# Patient Record
Sex: MALE | Race: WHITE | NOT HISPANIC OR LATINO | Employment: FULL TIME | URBAN - METROPOLITAN AREA
[De-identification: names, ages, dates, MRNs, and addresses within clinical notes are randomized per-mention and may not be internally consistent; named-entity substitution may affect disease eponyms.]

---

## 2017-05-28 ENCOUNTER — APPOINTMENT (OUTPATIENT)
Dept: RADIOLOGY | Facility: MEDICAL CENTER | Age: 37
End: 2017-05-28
Attending: EMERGENCY MEDICINE
Payer: COMMERCIAL

## 2017-05-28 ENCOUNTER — HOSPITAL ENCOUNTER (EMERGENCY)
Facility: MEDICAL CENTER | Age: 37
End: 2017-05-28
Attending: EMERGENCY MEDICINE
Payer: COMMERCIAL

## 2017-05-28 VITALS
DIASTOLIC BLOOD PRESSURE: 107 MMHG | SYSTOLIC BLOOD PRESSURE: 156 MMHG | TEMPERATURE: 98.8 F | WEIGHT: 282 LBS | HEART RATE: 79 BPM | BODY MASS INDEX: 38.19 KG/M2 | OXYGEN SATURATION: 94 % | HEIGHT: 72 IN | RESPIRATION RATE: 18 BRPM

## 2017-05-28 DIAGNOSIS — S82.831A CLOSED FRACTURE OF DISTAL END OF RIGHT FIBULA, UNSPECIFIED FRACTURE MORPHOLOGY, INITIAL ENCOUNTER: ICD-10-CM

## 2017-05-28 DIAGNOSIS — M24.171 DERANGEMENT OF ANKLE, RIGHT: ICD-10-CM

## 2017-05-28 PROCEDURE — A9270 NON-COVERED ITEM OR SERVICE: HCPCS | Performed by: EMERGENCY MEDICINE

## 2017-05-28 PROCEDURE — 29515 APPLICATION SHORT LEG SPLINT: CPT

## 2017-05-28 PROCEDURE — 99284 EMERGENCY DEPT VISIT MOD MDM: CPT

## 2017-05-28 PROCEDURE — 700111 HCHG RX REV CODE 636 W/ 250 OVERRIDE (IP): Performed by: EMERGENCY MEDICINE

## 2017-05-28 PROCEDURE — 700102 HCHG RX REV CODE 250 W/ 637 OVERRIDE(OP): Performed by: EMERGENCY MEDICINE

## 2017-05-28 PROCEDURE — 302875 HCHG BANDAGE ACE 4 OR 6""

## 2017-05-28 PROCEDURE — 73610 X-RAY EXAM OF ANKLE: CPT | Mod: RT

## 2017-05-28 RX ORDER — ONDANSETRON 4 MG/1
4 TABLET, FILM COATED ORAL EVERY 6 HOURS PRN
Qty: 15 TAB | Refills: 0 | Status: ON HOLD | OUTPATIENT
Start: 2017-05-28 | End: 2017-06-02

## 2017-05-28 RX ORDER — ONDANSETRON 2 MG/ML
4 INJECTION INTRAMUSCULAR; INTRAVENOUS ONCE
Status: COMPLETED | OUTPATIENT
Start: 2017-05-28 | End: 2017-05-28

## 2017-05-28 RX ORDER — OXYCODONE HYDROCHLORIDE AND ACETAMINOPHEN 5; 325 MG/1; MG/1
1 TABLET ORAL ONCE
Status: COMPLETED | OUTPATIENT
Start: 2017-05-28 | End: 2017-05-28

## 2017-05-28 RX ORDER — OXYCODONE HYDROCHLORIDE AND ACETAMINOPHEN 5; 325 MG/1; MG/1
1 TABLET ORAL EVERY 6 HOURS PRN
Qty: 20 TAB | Refills: 0 | Status: SHIPPED | OUTPATIENT
Start: 2017-05-28 | End: 2017-08-21

## 2017-05-28 RX ADMIN — ONDANSETRON 4 MG: 2 INJECTION INTRAMUSCULAR; INTRAVENOUS at 18:52

## 2017-05-28 RX ADMIN — OXYCODONE HYDROCHLORIDE AND ACETAMINOPHEN 1 TABLET: 5; 325 TABLET ORAL at 21:32

## 2017-05-28 RX ADMIN — HYDROMORPHONE HYDROCHLORIDE 1 MG: 1 INJECTION, SOLUTION INTRAMUSCULAR; INTRAVENOUS; SUBCUTANEOUS at 18:52

## 2017-05-28 ASSESSMENT — PAIN SCALES - GENERAL
PAINLEVEL_OUTOF10: 10
PAINLEVEL_OUTOF10: 10

## 2017-05-28 ASSESSMENT — LIFESTYLE VARIABLES: DO YOU DRINK ALCOHOL: NO

## 2017-05-28 NOTE — ED AVS SNAPSHOT
5/28/2017    Ken Avilez  [address]  [facility] [address]    Dear Ken:    [facility] wants to ensure your discharge home is safe and you or your loved ones have had all of your questions answered regarding your care after you leave the hospital.    Below is a list of resources and contact information should you have any questions regarding your hospital stay, follow-up instructions, or active medical symptoms.    Questions or Concerns Regarding… Contact   Medical Questions Related to Your Discharge  (7 days a week, 8am-5pm) Contact a Nurse Care Coordinator   [phone]   Medical Questions Not Related to Your Discharge  (24 hours a day / 7 days a week)  Contact the Nurse Health Line   [phone]    Medications or Discharge Instructions Refer to your discharge packet   or contact your [facility] Primary Care Provider   [phone]   Follow-up Appointment(s) Schedule your appointment via FestEvo   or contact Scheduling [phone]   Billing Review your statement via FestEvo  or contact Billing [phone]   Medical Records Review your records via FestEvo   or contact Medical Records [phone]     You may receive a telephone call within two days of discharge. This call is to make certain you understand your discharge instructions and have the opportunity to have any questions answered. You can also easily access your medical information, test results and upcoming appointments via the FestEvo free online health management tool. You can learn more and sign up at ShapeUp/FestEvo. For assistance setting up your FestEvo account, please call [phone].    Once again, we want to ensure your discharge home is safe and that you have a clear understanding of any next steps in your care. If you have any questions or concerns, please do not hesitate to contact us, we are here for you. Thank you for choosing [facility] for your healthcare needs.    Sincerely,    Your [facility] Healthcare Team

## 2017-05-28 NOTE — ED AVS SNAPSHOT
Home Care Instructions                                                                                                                Ken Avilez   MRN: [id number]    Department:  [facility], Emergency Dept   Date of Visit:  5/28/2017            [facility], Emergency Dept    [address]    [facility] [address]    Phone:  [phone]      You were seen by     Darrell Echeverria M.D.      Your Diagnosis Was     Closed fracture of distal end of right fibula, unspecified fracture morphology, initial encounter     S82.726M       These are the medications you received during your hospitalization from 05/28/2017 1818 to 05/28/2017 2127     Date/Time Order Dose Route Action    05/28/2017 1852 HYDROmorphone (DILAUDID) injection 1 mg 1 mg Intramuscular Given    05/28/2017 1852 ondansetron (ZOFRAN) syringe/vial injection 4 mg 4 mg Intramuscular Given      Follow-up Information     1. Follow up with Mike Luna M.D.. Schedule an appointment as soon as possible for a visit in 3 days.    Specialty:  Orthopaedics    Why:  call Tues for Wed appt    Contact information    [address]  [address]  [facility] [id number] [phone]        Medication Information     Review all of your home medications and newly ordered medications with your primary doctor and/or pharmacist as soon as possible. Follow medication instructions as directed by your doctor and/or pharmacist.     Please keep your complete medication list with you and share with your physician. Update the information when medications are discontinued, doses are changed, or new medications (including over-the-counter products) are added; and carry medication information at all times in the event of emergency situations.               Medication List      START taking these medications        Instructions    Morning Afternoon Evening Bedtime    ondansetron 4 MG Tabs tablet   Commonly known as:  ZOFRAN        Take 1 Tab by mouth  every 6 hours as needed for Nausea/Vomiting.   Dose:  4 mg                        oxycodone-acetaminophen 5-325 MG Tabs   Commonly known as:  PERCOCET        Take 1 Tab by mouth every 6 hours as needed (pain).   Dose:  1 Tab                          ASK your doctor about these medications        Instructions    Morning Afternoon Evening Bedtime    azithromycin 250 MG Tabs   Commonly known as:  ZITHROMAX        Take two tabs by mouth on day one, then one tab by mouth daily on days 2-5.                             Where to Get Your Medications      You can get these medications from any pharmacy     Bring a paper prescription for each of these medications    - ondansetron 4 MG Tabs tablet  - oxycodone-acetaminophen 5-325 MG Tabs            Procedures and tests performed during your visit     DX-ANKLE 3+ VIEWS RIGHT        Discharge Instructions       Cast or Splint Care  Casts and splints support injured limbs and keep bones from moving while they heal. It is important to care for your cast or splint at home.    HOME CARE INSTRUCTIONS  · Keep the cast or splint uncovered during the drying period. It can take 24 to 48 hours to dry if it is made of plaster. A fiberglass cast will dry in less than 1 hour.  · Do not rest the cast on anything harder than a pillow for the first 24 hours.  · Do not put weight on your injured limb or apply pressure to the cast until your health care provider gives you permission.  · Keep the cast or splint dry. Wet casts or splints can lose their shape and may not support the limb as well. A wet cast that has lost its shape can also create harmful pressure on your skin when it dries. Also, wet skin can become infected.  ¨ Cover the cast or splint with a plastic bag when bathing or when out in the rain or snow. If the cast is on the trunk of the body, take sponge baths until the cast is removed.  ¨ If your cast does become wet, dry it with a towel or a blow dryer on the cool setting  only.  · Keep your cast or splint clean. Soiled casts may be wiped with a moistened cloth.  · Do not place any hard or soft foreign objects under your cast or splint, such as cotton, toilet paper, lotion, or powder.  · Do not try to scratch the skin under the cast with any object. The object could get stuck inside the cast. Also, scratching could lead to an infection. If itching is a problem, use a blow dryer on a cool setting to relieve discomfort.  · Do not trim or cut your cast or remove padding from inside of it.  · Exercise all joints next to the injury that are not immobilized by the cast or splint. For example, if you have a long leg cast, exercise the hip joint and toes. If you have an arm cast or splint, exercise the shoulder, elbow, thumb, and fingers.  · Elevate your injured arm or leg on 1 or 2 pillows for the first 1 to 3 days to decrease swelling and pain. It is best if you can comfortably elevate your cast so it is higher than your heart.  SEEK MEDICAL CARE IF:   · Your cast or splint cracks.  · Your cast or splint is too tight or too loose.  · You have unbearable itching inside the cast.  · Your cast becomes wet or develops a soft spot or area.  · You have a bad smell coming from inside your cast.  · You get an object stuck under your cast.  · Your skin around the cast becomes red or raw.  · You have new pain or worsening pain after the cast has been applied.  SEEK IMMEDIATE MEDICAL CARE IF:   · You have fluid leaking through the cast.  · You are unable to move your fingers or toes.  · You have discolored (blue or white), cool, painful, or very swollen fingers or toes beyond the cast.  · You have tingling or numbness around the injured area.  · You have severe pain or pressure under the cast.  · You have any difficulty with your breathing or have shortness of breath.  · You have chest pain.     This information is not intended to replace advice given to you by your health care provider. Make sure you  discuss any questions you have with your health care provider.     Document Released: 12/15/2001 Document Revised: 10/08/2014 Document Reviewed: 06/26/2014  Elsevier Interactive Patient Education ©2016 Elsevier Inc.            Patient Information     Patient Information    Following emergency treatment: all patient requiring follow-up care must return either to a private physician or a clinic if your condition worsens before you are able to obtain further medical attention, please return to the emergency room.     Billing Information    At [facility], we work to make the billing process streamlined for our patients.  Our Representatives are here to answer any questions you may have regarding your hospital bill.  If you have insurance coverage and have supplied your insurance information to us, we will submit a claim to your insurer on your behalf.  Should you have any questions regarding your bill, we can be reached online or by phone as follows:  Online: You are able pay your bills online or live chat with our representatives about any billing questions you may have. We are here to help Monday - Friday from 8:00am to 7:30pm and 9:00am - 12:00pm on Saturdays.  Please visit [email or web address]  for more information.   Phone:  [phone] or [phone]    Please note that your emergency physician, surgeon, pathologist, radiologist, anesthesiologist, and other specialists are not employed by [facility] and will therefore bill separately for their services.  Please contact them directly for any questions concerning their bills at the numbers below:     Emergency Physician Services:  [phone]  [facility]:  [phone]  [facility]:  [phone]  [facility]:  [phone]    1. Your final bill may vary from the amount quoted upon discharge if all procedures are not complete at that time, or if your doctor has additional  procedures of which we are not aware. You will receive an additional bill if you return to the Emergency Department at [facility] for suture removal regardless of the facility of which the sutures were placed.     2. Please arrange for settlement of this account at the emergency registration.    3. All self-pay accounts are due in full at the time of treatment.  If you are unable to meet this obligation then payment is expected within 4-5 days.     4. If you have had radiology studies (CT, X-ray, Ultrasound, MRI), you have received a preliminary result during your emergency department visit. Please contact the radiology department [phone] to receive a copy of your final result. Please discuss the Final result with your primary physician or with the follow up physician provided.     Crisis Hotline:  [place] Crisis Hotline:  4-868-UOGLFYE or 1-560.289.2637  [place] Crisis Hotline:    1-603.775.6751 or 571-714-2791         ED Discharge Follow Up Questions    1. In order to provide you with very good care, we would like to follow up with a phone call in the next few days.  May we have your permission to contact you?     YES /  NO    2. What is the best phone number to call you? (       )_____-__________    3. What is the best time to call you?      Morning  /  Afternoon  /  Evening                   Patient Signature:  ____________________________________________________________    Date:  ____________________________________________________________

## 2017-05-28 NOTE — ED AVS SNAPSHOT
[employer] Access Code: [email or web address]  Expires: 6/21/2017  4:09 AM    Your email address is not on file at [facility].  Email Addresses are required for you to sign up for [employer], please contact [phone] to verify your personal information and to provide your email address prior to attempting to register for [employer].    Ken Avilez  [address]  [place] [address]    [employer]  A secure, online tool to manage your health information     [facility]’s [employer]® is a secure, online tool that connects you to your personalized health information from the privacy of your home -- day or night - making it very easy for you to manage your healthcare. Once the activation process is completed, you can even access your medical information using the [employer] gloria, which is available for free in the Apple Gloria store or Google Play store.     To learn more about [employer], visit [email or web address]    There are two levels of access available (as shown below):   My Chart Features  [facility] Primary Care Doctor [facility]  Specialists [facility]  Urgent  Care Non-[facility] Primary Care Doctor   Email your healthcare team securely and privately 24/7 X X X    Manage appointments: schedule your next appointment; view details of past/upcoming appointments X      Request prescription refills. X      View recent personal medical records, including lab and immunizations X X X X   View health record, including health history, allergies, medications X X X X   Read reports about your outpatient visits, procedures, consult and ER notes X X X X   See your discharge summary, which is a recap of your hospital and/or ER visit that includes your diagnosis, lab results, and care plan X X  X     How to register for [employer]:  Once your e-mail address has been verified, follow the following steps to sign up for [employer].     1. Go to  [email or web address]  2. Click on the Sign Up Now box, which takes you to the New Member Sign Up page.  You will need to provide the following information:  a. Enter your [email or web address] Access Code exactly as it appears at the top of this page. (You will not need to use this code after you’ve completed the sign-up process. If you do not sign up before the expiration date, you must request a new code.)   b. Enter your date of birth.   c. Enter your home email address.   d. Click Submit, and follow the next screen’s instructions.  3. Create a [employer]t ID. This will be your [email or web address] login ID and cannot be changed, so think of one that is secure and easy to remember.  4. Create a [email or web address] password. You can change your password at any time.  5. Enter your Password Reset Question and Answer. This can be used at a later time if you forget your password.   6. Enter your e-mail address. This allows you to receive e-mail notifications when new information is available in [email or web address].  7. Click Sign Up. You can now view your health information.    For assistance activating your [email or web address] account, call [phone]

## 2017-05-29 NOTE — ED NOTES
ED christopher Rosales at bedside for splint application and crutches education. Pt to be discharged following aforementioned interventions and medication for pain.

## 2017-05-29 NOTE — ED NOTES
Ken Avilez  37 y.o.  Chief Complaint   Patient presents with   • Ankle Pain     Pt was skateboarding when he stepped off his board and his ankle rolled 90 degree. Pt sts he heard 3 pops. No obvious deformity noted. Cms intact. Pt explained triage process. Pt aware to inform staff of any changes.

## 2017-05-29 NOTE — ED PROVIDER NOTES
ED Provider Note    HPI: Patient is a 37-year-old male who presented to the emergency department May 28, 2017 at 6:16 PM with a chief complaint of right ankle pain.    Patient rolled his ankle while skateboarding. Patient felt a couple pops in his ankle. He denies any knee pain. No numbness or tingling of the right lower extremity. No head or neck trauma occurred no other extremity injury. No other somatic complaints.    Review of Systems: Positive right ankle pain negative right knee pain numbness tingling right lower extremity    Past medical/surgical history: Kidney stone heartburn and hypertension and sleep apnea    Medications: None    Allergies: Vicodin    Social History: Patient smokes one pack of cigarettes per day no alcohol use      Physical exam: Constitutional: Pleasant male awake and alert appeared somewhat uncomfortable  Vital signs:   Temperature 97.8 pulse 113 respirations 18 blood pressure 162/125 pulse oximetry 97%   Musculoskeletal: Right ankle is swollen and there is pain with palpation of both the medial and lateral malleolus. There is no pain with vigorous palpation of the right knee. No pain with palpation of right foot. No other pain with palpation or movement of muscle bone or joint. No other musculoskeletal deformities identified.  Neurologic: alert and awake answers questions appropriately. Decreased range of motion of right lower extremity due to pain in the ankle area. The patient is able to move his toes on the affected extremity with no difficulty. No other focal neurologic abnormalities identified  Skin: no rash or lesion seen, no palpable dermatologic lesions identified. Specifically no break in the skin in the right ankle area  Psychiatric: Patient appeared to be somewhat anxious but not appropriately so. He was not delusional or hallucinating.    Medical decision making:  Patient is moderately hypertensive on arrival. I suspected this was due to his discomfort. The patient does  have a previous history of hypertension. He will follow up with his primary for recheck    Patient given hydromorphone and Zofran IM with improvement    X-ray right ankle obtained; oblique nondisplaced fracture of the distal right fibula is noted with widening of the medial tibiotalar joint space consistent with a ligamentous injury. Anterior swelling is noted with a joint effusion    Dr. Luna consulted for orthopedics; he reviewed the studies. He requested the patient be placed in an OCL sent home on crutches on pain medication. The patient will call on Tuesday for her Wednesday appointment. The patient will likely require surgery.    Patient is comfortable with this plan. The patient has no evidence of compartment syndrome or neurovascular issue at this time and outpatient follow-up seems reasonable    Impression medial ankle ligamentous disruption, closed  #2) distal fibular fracture, right, closed

## 2017-05-29 NOTE — DISCHARGE INSTRUCTIONS
Cast or Splint Care  Casts and splints support injured limbs and keep bones from moving while they heal. It is important to care for your cast or splint at home.    HOME CARE INSTRUCTIONS  · Keep the cast or splint uncovered during the drying period. It can take 24 to 48 hours to dry if it is made of plaster. A fiberglass cast will dry in less than 1 hour.  · Do not rest the cast on anything harder than a pillow for the first 24 hours.  · Do not put weight on your injured limb or apply pressure to the cast until your health care provider gives you permission.  · Keep the cast or splint dry. Wet casts or splints can lose their shape and may not support the limb as well. A wet cast that has lost its shape can also create harmful pressure on your skin when it dries. Also, wet skin can become infected.  ¨ Cover the cast or splint with a plastic bag when bathing or when out in the rain or snow. If the cast is on the trunk of the body, take sponge baths until the cast is removed.  ¨ If your cast does become wet, dry it with a towel or a blow dryer on the cool setting only.  · Keep your cast or splint clean. Soiled casts may be wiped with a moistened cloth.  · Do not place any hard or soft foreign objects under your cast or splint, such as cotton, toilet paper, lotion, or powder.  · Do not try to scratch the skin under the cast with any object. The object could get stuck inside the cast. Also, scratching could lead to an infection. If itching is a problem, use a blow dryer on a cool setting to relieve discomfort.  · Do not trim or cut your cast or remove padding from inside of it.  · Exercise all joints next to the injury that are not immobilized by the cast or splint. For example, if you have a long leg cast, exercise the hip joint and toes. If you have an arm cast or splint, exercise the shoulder, elbow, thumb, and fingers.  · Elevate your injured arm or leg on 1 or 2 pillows for the first 1 to 3 days to decrease  swelling and pain. It is best if you can comfortably elevate your cast so it is higher than your heart.  SEEK MEDICAL CARE IF:   · Your cast or splint cracks.  · Your cast or splint is too tight or too loose.  · You have unbearable itching inside the cast.  · Your cast becomes wet or develops a soft spot or area.  · You have a bad smell coming from inside your cast.  · You get an object stuck under your cast.  · Your skin around the cast becomes red or raw.  · You have new pain or worsening pain after the cast has been applied.  SEEK IMMEDIATE MEDICAL CARE IF:   · You have fluid leaking through the cast.  · You are unable to move your fingers or toes.  · You have discolored (blue or white), cool, painful, or very swollen fingers or toes beyond the cast.  · You have tingling or numbness around the injured area.  · You have severe pain or pressure under the cast.  · You have any difficulty with your breathing or have shortness of breath.  · You have chest pain.     This information is not intended to replace advice given to you by your health care provider. Make sure you discuss any questions you have with your health care provider.     Document Released: 12/15/2001 Document Revised: 10/08/2014 Document Reviewed: 06/26/2014  [employer] Interactive Patient Education ©2016 Elsevier Inc.

## 2017-05-29 NOTE — ED NOTES
Pt arrived in room  Pt resting comfortably on [place].   Call light within reach and visible from nurses station.

## 2017-05-29 NOTE — CONSULTS
DATE OF SERVICE:  05/28/2017    DATE OF SERVICE:  05/28/2017.    REQUESTING PHYSICIAN:  Darrell Echeverria MD    CHIEF COMPLAINT:  Right ankle pain.    HISTORY:  The patient is 37 years old, who was skateboarding and landed   awkwardly, twisted his right ankle 90 degrees, had pain and could not bear   weight, was seen in the emergency room, found to have an ankle fracture.    Orthopedic consultation was requested.    ALLERGIES:  VICODIN.    MEDICATIONS:  None.    PAST MEDICAL HISTORY:  Kidney stones, hypertension and sleep apnea.    PAST SURGICAL HISTORY:  Kidney stone surgery.    SOCIAL HISTORY:  The patient smokes a pack a day, uses alcohol rarely.  Denies   any drug use.  He works in a desk job.  He lives in [place].    FAMILY HISTORY:  Negative.    REVIEW OF SYSTEMS:  No loss of conscious, nausea, vomiting, diarrhea,   constipation, polyuria, dysuria, fevers, chills, weight loss, weight gain,   abdominal pain, chest pain or shortness of breath.    PHYSICAL EXAMINATION:  GENERAL:  The patient is in no acute distress.  VITAL SIGNS:  His blood pressure is 160/125, heart rate 113, respirations 18,   temperature 97.8.  HEENT:  Normocephalic, atraumatic.  NECK:  Supple, nontender.  CHEST:  Nontender.  No labored breathing.  EXTREMITIES:  Left lower and bilateral upper extremities without tenderness or   deformity.  Right lower extremity shows moderate swelling at the ankle.  He   is tender around the medial and lateral side of the ankle.  He has palpable   dorsalis pedis and posterior tibialis pulse.  Skin is intact.  He is able to   dorsiflex and plantarflex his toes.    LABORATORY DATA:  No labs.    IMAGING:  Radiographs of the right ankle show mildly displaced spiral fracture   of the distal fibula at the level of the joint with some widening of the   medial joint space.    ASSESSMENT:  Right ankle fracture.    PLAN:  Place a short leg splint.  He should be nonweightbearing and elevate.    He will require surgery to  realign and stabilize the ankle.  I have explained   this to him today and he understands.  We will call him on Tuesday and set up   surgery for later in the week.  I discussed the risks of the surgery with him.    I also recommended smoking cessation.  Phone number that he gave me was   [phone].       ____________________________________     MD JARED MARTINEZ / EMIL    DD:  05/28/2017 21:29:59  DT:  05/28/2017 21:46:36    D#:  [id number]  Job#:  [id number]

## 2017-06-02 ENCOUNTER — APPOINTMENT (OUTPATIENT)
Dept: RADIOLOGY | Facility: MEDICAL CENTER | Age: 37
End: 2017-06-02
Attending: ORTHOPAEDIC SURGERY
Payer: COMMERCIAL

## 2017-06-02 ENCOUNTER — HOSPITAL ENCOUNTER (OUTPATIENT)
Facility: MEDICAL CENTER | Age: 37
End: 2017-06-02
Attending: ORTHOPAEDIC SURGERY | Admitting: ORTHOPAEDIC SURGERY
Payer: COMMERCIAL

## 2017-06-02 VITALS
OXYGEN SATURATION: 100 % | RESPIRATION RATE: 16 BRPM | TEMPERATURE: 97.2 F | BODY MASS INDEX: 38.46 KG/M2 | HEIGHT: 72 IN | HEART RATE: 73 BPM | WEIGHT: 283.95 LBS

## 2017-06-02 DIAGNOSIS — S82.831A OTHER FRACTURE OF UPPER AND LOWER END OF RIGHT FIBULA, INITIAL ENCOUNTER FOR CLOSED FRACTURE: ICD-10-CM

## 2017-06-02 PROCEDURE — 73610 X-RAY EXAM OF ANKLE: CPT | Mod: RT

## 2017-06-02 PROCEDURE — 700101 HCHG RX REV CODE 250

## 2017-06-02 PROCEDURE — 160048 HCHG OR STATISTICAL LEVEL 1-5: Performed by: ORTHOPAEDIC SURGERY

## 2017-06-02 PROCEDURE — 160025 RECOVERY II MINUTES (STATS): Performed by: ORTHOPAEDIC SURGERY

## 2017-06-02 PROCEDURE — 502000 HCHG MISC OR IMPLANTS RC 0278: Performed by: ORTHOPAEDIC SURGERY

## 2017-06-02 PROCEDURE — 160035 HCHG PACU - 1ST 60 MINS PHASE I: Performed by: ORTHOPAEDIC SURGERY

## 2017-06-02 PROCEDURE — A9270 NON-COVERED ITEM OR SERVICE: HCPCS

## 2017-06-02 PROCEDURE — C1713 ANCHOR/SCREW BN/BN,TIS/BN: HCPCS | Performed by: ORTHOPAEDIC SURGERY

## 2017-06-02 PROCEDURE — 501838 HCHG SUTURE GENERAL: Performed by: ORTHOPAEDIC SURGERY

## 2017-06-02 PROCEDURE — 160029 HCHG SURGERY MINUTES - 1ST 30 MINS LEVEL 4: Performed by: ORTHOPAEDIC SURGERY

## 2017-06-02 PROCEDURE — 501480 HCHG STOCKINETTE: Performed by: ORTHOPAEDIC SURGERY

## 2017-06-02 PROCEDURE — 160046 HCHG PACU - 1ST 60 MINS PHASE II: Performed by: ORTHOPAEDIC SURGERY

## 2017-06-02 PROCEDURE — 700102 HCHG RX REV CODE 250 W/ 637 OVERRIDE(OP)

## 2017-06-02 PROCEDURE — 160022 HCHG BLOCK: Performed by: ORTHOPAEDIC SURGERY

## 2017-06-02 PROCEDURE — 502240 HCHG MISC OR SUPPLY RC 0272: Performed by: ORTHOPAEDIC SURGERY

## 2017-06-02 PROCEDURE — 700111 HCHG RX REV CODE 636 W/ 250 OVERRIDE (IP)

## 2017-06-02 PROCEDURE — 160002 HCHG RECOVERY MINUTES (STAT): Performed by: ORTHOPAEDIC SURGERY

## 2017-06-02 PROCEDURE — 500881 HCHG PACK, EXTREMITY: Performed by: ORTHOPAEDIC SURGERY

## 2017-06-02 PROCEDURE — 160041 HCHG SURGERY MINUTES - EA ADDL 1 MIN LEVEL 4: Performed by: ORTHOPAEDIC SURGERY

## 2017-06-02 PROCEDURE — 160009 HCHG ANES TIME/MIN: Performed by: ORTHOPAEDIC SURGERY

## 2017-06-02 PROCEDURE — 160047 HCHG PACU  - EA ADDL 30 MINS PHASE II: Performed by: ORTHOPAEDIC SURGERY

## 2017-06-02 PROCEDURE — 501445 HCHG STAPLER, SKIN DISP: Performed by: ORTHOPAEDIC SURGERY

## 2017-06-02 DEVICE — PLATE DISTAL FIBULA 4H (2TX2+2TX1=6): Type: IMPLANTABLE DEVICE | Site: ANKLE | Status: FUNCTIONAL

## 2017-06-02 DEVICE — SCREW 3.5 MM NON-LOCKING TI X 16MM LONG (6TX8+2TX5=58): Type: IMPLANTABLE DEVICE | Site: ANKLE | Status: FUNCTIONAL

## 2017-06-02 DEVICE — SCREW 2.5 MM LOCKING TI X 12MM LONG (6TX8=48): Type: IMPLANTABLE DEVICE | Site: ANKLE | Status: FUNCTIONAL

## 2017-06-02 DEVICE — SCREW 3.5 MM LOCKING TI X 16MM LONG (6TX8+2TX5=58): Type: IMPLANTABLE DEVICE | Site: ANKLE | Status: FUNCTIONAL

## 2017-06-02 DEVICE — SCREW 2.5 MM LOCKING TI X 14MM LONG (6TX8=48): Type: IMPLANTABLE DEVICE | Site: ANKLE | Status: FUNCTIONAL

## 2017-06-02 DEVICE — SCREW 2.5 MM NON-LOCKING TI X 22MM LONG (6TX8=48): Type: IMPLANTABLE DEVICE | Site: ANKLE | Status: FUNCTIONAL

## 2017-06-02 RX ORDER — OXYCODONE HCL 5 MG/5 ML
SOLUTION, ORAL ORAL
Status: COMPLETED
Start: 2017-06-02 | End: 2017-06-02

## 2017-06-02 RX ORDER — SODIUM CHLORIDE, SODIUM LACTATE, POTASSIUM CHLORIDE, CALCIUM CHLORIDE 600; 310; 30; 20 MG/100ML; MG/100ML; MG/100ML; MG/100ML
INJECTION, SOLUTION INTRAVENOUS CONTINUOUS
Status: DISCONTINUED | OUTPATIENT
Start: 2017-06-02 | End: 2017-06-02 | Stop reason: HOSPADM

## 2017-06-02 RX ORDER — LIDOCAINE AND PRILOCAINE 25; 25 MG/G; MG/G
1 CREAM TOPICAL
Status: COMPLETED | OUTPATIENT
Start: 2017-06-02 | End: 2017-06-02

## 2017-06-02 RX ORDER — LIDOCAINE HYDROCHLORIDE 10 MG/ML
0.5 INJECTION, SOLUTION INFILTRATION; PERINEURAL
Status: COMPLETED | OUTPATIENT
Start: 2017-06-02 | End: 2017-06-02

## 2017-06-02 RX ORDER — LIDOCAINE HYDROCHLORIDE 10 MG/ML
INJECTION, SOLUTION INFILTRATION; PERINEURAL
Status: COMPLETED
Start: 2017-06-02 | End: 2017-06-02

## 2017-06-02 RX ORDER — IBUPROFEN 200 MG
1000 TABLET ORAL EVERY 6 HOURS PRN
COMMUNITY
End: 2023-02-11

## 2017-06-02 RX ADMIN — FENTANYL CITRATE 50 MCG: 50 INJECTION, SOLUTION INTRAMUSCULAR; INTRAVENOUS at 15:23

## 2017-06-02 RX ADMIN — LIDOCAINE HYDROCHLORIDE 0.5 ML: 10 INJECTION, SOLUTION INFILTRATION; PERINEURAL at 12:30

## 2017-06-02 RX ADMIN — SODIUM CHLORIDE, SODIUM LACTATE, POTASSIUM CHLORIDE, CALCIUM CHLORIDE: 600; 310; 30; 20 INJECTION, SOLUTION INTRAVENOUS at 12:30

## 2017-06-02 RX ADMIN — OXYCODONE HYDROCHLORIDE 10 MG: 5 SOLUTION ORAL at 15:23

## 2017-06-02 ASSESSMENT — PAIN SCALES - GENERAL
PAINLEVEL_OUTOF10: 6
PAINLEVEL_OUTOF10: 0
PAINLEVEL_OUTOF10: 3
PAINLEVEL_OUTOF10: 5
PAINLEVEL_OUTOF10: 0

## 2017-06-02 NOTE — IP AVS SNAPSHOT
Home Care Instructions                                                                                                                Name:Ken Avilez  Medical Record Number:[id number]  CSN: [id number]    YOB: 1980   Age: 37 y.o.  Sex: male  HT:1.829 m (6') WT: 128.8 kg (283 lb 15.2 oz)          Admit Date: 6/2/2017     Discharge Date:   Today's Date: 6/2/2017  Attending Doctor:  Mike Luna M.D.                  Allergies:  Hydrocodone-acetaminophen              Follow-up Information     1. Follow up with Mike Luna M.D. In 2 weeks.    Specialty:  Orthopaedics    Why:  6/19 or 6/21    Contact information    [address]  [address]  [facility] [address]  [phone]          Discharge Instructions         ACTIVITY: Rest and take it easy for the first 24 hours.  A responsible adult is recommended to remain with you during that time.  It is normal to feel sleepy.  We encourage you to not do anything that requires balance, judgment or coordination.    MILD FLU-LIKE SYMPTOMS ARE NORMAL. YOU MAY EXPERIENCE GENERALIZED MUSCLE ACHES, THROAT IRRITATION, HEADACHE AND/OR SOME NAUSEA.    FOR 24 HOURS DO NOT:  Drive, operate machinery or run household appliances.  Drink beer or alcoholic beverages.   Make important decisions or sign legal documents.    SPECIAL INSTRUCTIONS: *  Non Weight Bearing Right Lower Extremity   May remove boot for ankle ROM   May remove/change dressing 4-5 days  **    DIET: To avoid nausea, slowly advance diet as tolerated, avoiding spicy or greasy foods for the first day.  Add more substantial food to your diet according to your physician's instructions.  Babies can be fed formula or breast milk as soon as they are hungry.  INCREASE FLUIDS AND FIBER TO AVOID CONSTIPATION.    SURGICAL DRESSING/BATHING: ***    FOLLOW-UP APPOINTMENT:  A follow-up appointment should be arranged with your doctor in ***; call to schedule.    You should CALL YOUR PHYSICIAN if you develop:  Fever  greater than 101 degrees F.  Pain not relieved by medication, or persistent nausea or vomiting.  Excessive bleeding (blood soaking through dressing) or unexpected drainage from the wound.  Extreme redness or swelling around the incision site, drainage of pus or foul smelling drainage.  Inability to urinate or empty your bladder within 8 hours.  Problems with breathing or chest pain.    You should call 911 if you develop problems with breathing or chest pain.  If you are unable to contact your doctor or surgical center, you should go to the nearest emergency room or urgent care center.  Physician's telephone #: *[phone]**    If any questions arise, call your doctor.  If your doctor is not available, please feel free to call the Surgical Center at [phone].  You can also call the [employer] HOTLINE open 24 hours/day, 7 days/week and speak to a nurse at [phone], or toll free at [phone].    A registered nurse may call you a few days after your surgery to see how you are doing after your procedure.    MEDICATIONS: Resume taking daily medication.  Take prescribed pain medication with food.  If no medication is prescribed, you may take non-aspirin pain medication if needed.  PAIN MEDICATION CAN BE VERY CONSTIPATING.  Take a stool softener or laxative such as senokot, pericolace, or milk of magnesia if needed.    Prescription given for *has at home**.  Last pain medication given at *3:23 pm**.    If your physician has prescribed pain medication that includes Acetaminophen (Tylenol), do not take additional Acetaminophen (Tylenol) while taking the prescribed medication.    Depression / Suicide Risk    As you are discharged from this [facility] facility, it is important to learn how to keep safe from harming yourself.    Recognize the warning signs:  · Abrupt changes in personality, positive or negative- including increase in energy   · Giving away possessions  · Change in eating patterns- significant weight  changes-  positive or negative  · Change in sleeping patterns- unable to sleep or sleeping all the time   · Unwillingness or inability to communicate  · Depression  · Unusual sadness, discouragement and loneliness  · Talk of wanting to die  · Neglect of personal appearance   · Rebelliousness- reckless behavior  · Withdrawal from people/activities they love  · Confusion- inability to concentrate     If you or a loved one observes any of these behaviors or has concerns about self-harm, here's what you can do:  · Talk about it- your feelings and reasons for harming yourself  · Remove any means that you might use to hurt yourself (examples: pills, rope, extension cords, firearm)  · Get professional help from the community (Mental Health, Substance Abuse, psychological counseling)  · Do not be alone:Call your Safe Contact- someone whom you trust who will be there for you.  · Call your local CRISIS HOTLINE 150-3033 or 677-578-7306  · Call your local Children's Mobile Crisis Response Team [place] (914) 827-5025 or www.[employer]  · Call the toll free National Suicide Prevention Hotlines   · National Suicide Prevention Lifeline 277-665-UTZE (7087)  · National Hope Line Network 800-SUICIDE (754-5842)       Medication List      CONTINUE taking these medications        Instructions    Morning Afternoon Evening Bedtime    ibuprofen 200 MG Tabs   Commonly known as:  MOTRIN        Take 1,000 mg by mouth every 6 hours as needed.   Dose:  1000 mg                        oxycodone-acetaminophen 5-325 MG Tabs   Commonly known as:  PERCOCET        Take 1 Tab by mouth every 6 hours as needed (pain).   Dose:  1 Tab                                Medication Information     Above and/or attached are the medications your physician expects you to take upon discharge. Review all of your home medications and newly ordered medications with your doctor and/or pharmacist. Follow medication instructions as directed by your doctor  and/or pharmacist. Please keep your medication list with you and share with your physician. Update the information when medications are discontinued, doses are changed, or new medications (including over-the-counter products) are added; and carry medication information at all times in the event of emergency situations.        Resources     Quit Smoking / Tobacco Use:    I understand the use of any tobacco products increases my chance of suffering from future heart disease or stroke and could cause other illnesses which may shorten my life. Quitting the use of tobacco products is the single most important thing I can do to improve my health. For further information on smoking / tobacco cessation call a Toll Free Quit Line at 1-721.331.6668 (*[employer] [place]) or 1-434.891.4291 (American Lung Association) or you can access the web based program at www.[employer]Worth Foundation Fund.org.    [place] Tobacco Users Help Line:  (282) 933-5382       Toll Free: 1-297.104.6525  Quit Tobacco Program [facility] (334)493-8399    Crisis Hotline:    [place] Crisis Hotline:  3-888-DBTYIRZ or 1-205.280.2313    [place] Crisis Hotline:    1-516.540.9491 or 060-507-1032    Discharge Survey:   Thank you for choosing [facility]. We hope we did everything we could to make your hospital stay a pleasant one. You may be receiving a survey and we would appreciate your time and participation in answering the questions. Your input is very valuable to us in our efforts to improve our service to our patients and their families.            Signatures     My signature on this form indicates that:    1. I acknowledge receipt and understanding of these Home Care Instruction.  2. My questions regarding this information have been answered to my satisfaction.  3. I have formulated a plan with my discharge nurse to obtain my prescribed medications for home.    __________________________________      __________________________________                    Patient Signature                                 Guardian/Responsible Adult Signature      __________________________________                 __________       ________                       Nurse Signature                                               Date                 Time

## 2017-06-02 NOTE — PROGRESS NOTES
The Medication Reconciliation process has been completed by interviewing the patient    Allergies have been reviewed  Antibiotic use in 30 days - none    Home Pharmacy:  [facility] [place]

## 2017-06-02 NOTE — OR SURGEON
Immediate Post-Operative Note      PreOp Diagnosis: Right ankle fx    PostOp Diagnosis: Same    Procedure(s):  ANKLE ORIF - Wound Class: Clean    Surgeon(s):  Mike Luna M.D.    Anesthesiologist/Type of Anesthesia:  Anesthesiologist: Jose Oliva M.D./Block    Surgical Staff:  Circulator: Isaiah Mena R.N.  Scrub Person: Cee Pena  Radiology Technologist: Johny Fowler    Specimen: None    Estimated Blood Loss: 10 cc    Findings: Fracture    Complications: None        6/2/2017 3:04 PM Mike Luna

## 2017-06-02 NOTE — DISCHARGE INSTRUCTIONS
ACTIVITY: Rest and take it easy for the first 24 hours.  A responsible adult is recommended to remain with you during that time.  It is normal to feel sleepy.  We encourage you to not do anything that requires balance, judgment or coordination.    MILD FLU-LIKE SYMPTOMS ARE NORMAL. YOU MAY EXPERIENCE GENERALIZED MUSCLE ACHES, THROAT IRRITATION, HEADACHE AND/OR SOME NAUSEA.    FOR 24 HOURS DO NOT:  Drive, operate machinery or run household appliances.  Drink beer or alcoholic beverages.   Make important decisions or sign legal documents.    SPECIAL INSTRUCTIONS: *  Non Weight Bearing Right Lower Extremity   May remove boot for ankle ROM   May remove/change dressing 4-5 days  **    DIET: To avoid nausea, slowly advance diet as tolerated, avoiding spicy or greasy foods for the first day.  Add more substantial food to your diet according to your physician's instructions.  Babies can be fed formula or breast milk as soon as they are hungry.  INCREASE FLUIDS AND FIBER TO AVOID CONSTIPATION.    SURGICAL DRESSING/BATHING: ***    FOLLOW-UP APPOINTMENT:  A follow-up appointment should be arranged with your doctor in ***; call to schedule.    You should CALL YOUR PHYSICIAN if you develop:  Fever greater than 101 degrees F.  Pain not relieved by medication, or persistent nausea or vomiting.  Excessive bleeding (blood soaking through dressing) or unexpected drainage from the wound.  Extreme redness or swelling around the incision site, drainage of pus or foul smelling drainage.  Inability to urinate or empty your bladder within 8 hours.  Problems with breathing or chest pain.    You should call 911 if you develop problems with breathing or chest pain.  If you are unable to contact your doctor or surgical center, you should go to the nearest emergency room or urgent care center.  Physician's telephone #: *[phone]**    If any questions arise, call your doctor.  If your doctor is not available, please feel free to call the  Surgical Center at [phone].  You can also call the HEALTH HOTLINE open 24 hours/day, 7 days/week and speak to a nurse at [phone], or toll free at [phone].    A registered nurse may call you a few days after your surgery to see how you are doing after your procedure.    MEDICATIONS: Resume taking daily medication.  Take prescribed pain medication with food.  If no medication is prescribed, you may take non-aspirin pain medication if needed.  PAIN MEDICATION CAN BE VERY CONSTIPATING.  Take a stool softener or laxative such as senokot, pericolace, or milk of magnesia if needed.    Prescription given for *has at home**.  Last pain medication given at *3:23 pm**.    If your physician has prescribed pain medication that includes Acetaminophen (Tylenol), do not take additional Acetaminophen (Tylenol) while taking the prescribed medication.    Depression / Suicide Risk    As you are discharged from this [facility] facility, it is important to learn how to keep safe from harming yourself.    Recognize the warning signs:  · Abrupt changes in personality, positive or negative- including increase in energy   · Giving away possessions  · Change in eating patterns- significant weight changes-  positive or negative  · Change in sleeping patterns- unable to sleep or sleeping all the time   · Unwillingness or inability to communicate  · Depression  · Unusual sadness, discouragement and loneliness  · Talk of wanting to die  · Neglect of personal appearance   · Rebelliousness- reckless behavior  · Withdrawal from people/activities they love  · Confusion- inability to concentrate     If you or a loved one observes any of these behaviors or has concerns about self-harm, here's what you can do:  · Talk about it- your feelings and reasons for harming yourself  · Remove any means that you might use to hurt yourself (examples: pills, rope, extension cords, firearm)  · Get professional help from the community (Mental  Health, Substance Abuse, psychological counseling)  · Do not be alone:Call your Safe Contact- someone whom you trust who will be there for you.  · Call your local CRISIS HOTLINE 442-3428 or 234-684-7485  · Call your local Children's Mobile Crisis Response Team [place] (540) 124-1559 or www.[employer]  · Call the toll free National Suicide Prevention Hotlines   · National Suicide Prevention Lifeline 009-763-FEIH (5037)  · National Hope Line Network 800-SUICIDE (862-8934)

## 2017-06-02 NOTE — IP AVS SNAPSHOT
6/2/2017    Ken Avilez  [address]  [facility] [address]    Dear Ken:    [facility] wants to ensure your discharge home is safe and you or your loved ones have had all of your questions answered regarding your care after you leave the hospital.    Below is a list of resources and contact information should you have any questions regarding your hospital stay, follow-up instructions, or active medical symptoms.    Questions or Concerns Regarding… Contact   Medical Questions Related to Your Discharge  (7 days a week, 8am-5pm) Contact a Nurse Care Coordinator   [phone]   Medical Questions Not Related to Your Discharge  (24 hours a day / 7 days a week)  Contact the Nurse Health Line   [phone]    Medications or Discharge Instructions Refer to your discharge packet   or contact your [facility] Primary Care Provider   [phone]   Follow-up Appointment(s) Schedule your appointment via [employer]   or contact Scheduling [phone]   Billing Review your statement via [employer]  or contact Billing [phone]   Medical Records Review your records via [employer]   or contact Medical Records [phone]     You may receive a telephone call within two days of discharge. This call is to make certain you understand your discharge instructions and have the opportunity to have any questions answered. You can also easily access your medical information, test results and upcoming appointments via the [employer] free online health management tool. You can learn more and sign up at [employer]. For assistance setting up your [employer] account, please call [phone].    Once again, we want to ensure your discharge home is safe and that you have a clear understanding of any next steps in your care. If you have any questions or concerns, please do not hesitate to contact us, we are here for you. Thank you for choosing [facility] for your healthcare needs.    Sincerely,    Your [facility] Healthcare Team

## 2017-06-03 NOTE — OP REPORT
DATE OF SERVICE:  06/02/2017    PREOPERATIVE DIAGNOSES:  Right ankle fracture.    POSTOPERATIVE DIAGNOSIS:  Right ankle fracture.    SURGICAL PROCEDURE:  1.  Open reduction and internal fixation of right distal fibula.  2.  Stress examination of right ankle under fluoroscopy.  3.  Syndesmotic screw fixation, right ankle.    SURGEON:  Mike Luna MD    ASSISTANT:  None.    ANESTHESIOLOGIST:  Jose Oliva MD    ANESTHESIA:  General plus preoperative popliteal block.    TOURNIQUET TIME:  Less than 20 minutes.    ESTIMATED BLOOD LOSS:  10 mL.    INDICATIONS:  The patient is 37 years old.  He twisted his right ankle when he   was skateboarding approximately 5 days ago.  He sustained a fracture of the   distal fibula with widening of his medial joint space.  I recommended open   reduction and internal fixation.  Risks were discussed.  These include   bleeding, infection, neurovascular injury, pain, stiffness, arthritis,   nonunion, malunion, thromboembolic phenomena, anesthetic and medical   complications, etc.    DESCRIPTION OF PROCEDURE:  The patient was identified in the preoperative   holding area.  His right leg was marked.  He was taken to the operating room   where a popliteal block was administered.  He was then placed supine on the   operating table and general anesthesia was administered.  Bump was placed   under the right hip.  Intravenous antibiotics were given.  A nonsterile   tourniquet placed on the right thigh.  Right lower extremity was then prepped   and draped in the sterile fashion.  Time-out was held to confirm the patient   identity and correct surgical site.    Right leg was elevated.  The tourniquet was inflated to 275 mmHg.    Longitudinal incision was made along the lateral aspect of the ankle.  This   was carried down to the fibula.  The fracture was identified and cleaned.  I   then reduced it.  There was an anterior fragment that was free as well and I   reduced this.  I placed an  initial 2.5 mm screw between the anterior   comminuted fragment and the distal fragment.  This held the fragments in   place.  However, I could not get a screw between the major proximal and major   distal fragment.  I held reduction with the clamp.  I then placed an [facility]   4-hole distal fibular plate in the appropriate position, placed a nonlocking   screw in the proximal hole and then placed 4 locking screws distally.  One   more locking screw was placed proximally.  The clamps were removed.    Fluoroscopic images showed good reduction.  The ankle was stressed under   fluoroscopy and there was widening of the ankle area of the medial joint   space.    The stress was removed.  The ankle mortise was returned to a good position.  I   then placed a quadricortical 3.5 mm screw through one of the holes in the   plate.  I then stressed the ankle under fluoroscopy and ankle mortise was   maintained.  Fluoroscopic images also showed good reduction and implant   placement.    The tourniquet had been deflated midway through the case when it started to   malfunction.  There was minimal bleeding.  Hemostasis was obtained.    No additional local anesthetic was administered.  The deep tissue was closed   with 2-0 Vicryl and skin was closed with 2-0 Vicryl and 2-0 nylon.  Dressings   were applied.  The patient was extubated and taken to recovery room in stable   condition.    POSTOPERATIVE PLAN:  1.  Elevation for 48-72 hours.  2.  Nonweightbearing right lower extremity for 6 weeks.  3.  Wound check, baseline radiographs and suture removal in approximately 2   weeks.       ____________________________________     MD JARED MARTINEZ / EMIL    DD:  06/02/2017 16:53:54  DT:  06/02/2017 18:19:23    D#:  [id number]  Job#:  [id number]

## 2017-08-12 ENCOUNTER — APPOINTMENT (OUTPATIENT)
Dept: RADIOLOGY | Facility: MEDICAL CENTER | Age: 37
End: 2017-08-12
Attending: EMERGENCY MEDICINE
Payer: COMMERCIAL

## 2017-08-12 ENCOUNTER — HOSPITAL ENCOUNTER (EMERGENCY)
Facility: MEDICAL CENTER | Age: 37
End: 2017-08-12
Attending: EMERGENCY MEDICINE
Payer: COMMERCIAL

## 2017-08-12 VITALS
HEIGHT: 72 IN | SYSTOLIC BLOOD PRESSURE: 166 MMHG | DIASTOLIC BLOOD PRESSURE: 78 MMHG | BODY MASS INDEX: 38.87 KG/M2 | RESPIRATION RATE: 14 BRPM | OXYGEN SATURATION: 96 % | HEART RATE: 82 BPM | TEMPERATURE: 99.6 F | WEIGHT: 287 LBS

## 2017-08-12 DIAGNOSIS — S99.911A ANKLE INJURY, RIGHT, INITIAL ENCOUNTER: ICD-10-CM

## 2017-08-12 PROCEDURE — 700102 HCHG RX REV CODE 250 W/ 637 OVERRIDE(OP): Performed by: EMERGENCY MEDICINE

## 2017-08-12 PROCEDURE — A9270 NON-COVERED ITEM OR SERVICE: HCPCS | Performed by: EMERGENCY MEDICINE

## 2017-08-12 PROCEDURE — 73610 X-RAY EXAM OF ANKLE: CPT | Mod: RT

## 2017-08-12 PROCEDURE — 99284 EMERGENCY DEPT VISIT MOD MDM: CPT

## 2017-08-12 RX ORDER — OXYCODONE HYDROCHLORIDE AND ACETAMINOPHEN 5; 325 MG/1; MG/1
1 TABLET ORAL EVERY 6 HOURS PRN
Qty: 20 TAB | Refills: 0 | Status: SHIPPED | OUTPATIENT
Start: 2017-08-12 | End: 2017-08-21

## 2017-08-12 RX ORDER — OXYCODONE HYDROCHLORIDE AND ACETAMINOPHEN 5; 325 MG/1; MG/1
1 TABLET ORAL ONCE
Status: COMPLETED | OUTPATIENT
Start: 2017-08-12 | End: 2017-08-12

## 2017-08-12 RX ADMIN — OXYCODONE HYDROCHLORIDE AND ACETAMINOPHEN 1 TABLET: 5; 325 TABLET ORAL at 18:36

## 2017-08-12 ASSESSMENT — PAIN SCALES - GENERAL: PAINLEVEL_OUTOF10: 4

## 2017-08-12 NOTE — ED AVS SNAPSHOT
Home Care Instructions                                                                                                                Ken Avilez   MRN: [id number]    Department:  [facility], Emergency Dept   Date of Visit:  8/12/2017            [facility], Emergency Dept    [address]    [facility] [address]    Phone:  [phone]      You were seen by     Baljeet Greenberg M.D.      Your Diagnosis Was     Ankle injury, right, initial encounter     S99.911A       These are the medications you received during your hospitalization from 08/12/2017 1805 to 08/12/2017 2034     Date/Time Order Dose Route Action    08/12/2017 1836 oxycodone-acetaminophen (PERCOCET) 5-325 MG per tablet 1 Tab 1 Tab Oral Given      Follow-up Information     1. Call Mike Luna M.D..    Specialty:  Orthopaedics    Why:  call the office Monday morning and arrange office recheck as soon as possible during the week    Contact information    [address]  [address]  [facility] [address]  [phone]        Medication Information     Review all of your home medications and newly ordered medications with your primary doctor and/or pharmacist as soon as possible. Follow medication instructions as directed by your doctor and/or pharmacist.     Please keep your complete medication list with you and share with your physician. Update the information when medications are discontinued, doses are changed, or new medications (including over-the-counter products) are added; and carry medication information at all times in the event of emergency situations.               Medication List      CHANGE how you take these medications        Instructions    Morning Afternoon Evening Bedtime    * oxycodone-acetaminophen 5-325 MG Tabs   What changed:  Another medication with the same name was added. Make sure you understand how and when to take each.   Last time this was given:  1 Tab on 8/12/2017  6:36 PM   Commonly  known as:  PERCOCET        Take 1 Tab by mouth every 6 hours as needed (pain).   Dose:  1 Tab                        * oxycodone-acetaminophen 5-325 MG Tabs   What changed:  You were already taking a medication with the same name, and this prescription was added. Make sure you understand how and when to take each.   Last time this was given:  1 Tab on 8/12/2017  6:36 PM   Commonly known as:  PERCOCET        Take 1 Tab by mouth every 6 hours as needed for Moderate Pain.   Dose:  1 Tab                        * Notice:  This list has 2 medication(s) that are the same as other medications prescribed for you. Read the directions carefully, and ask your doctor or other care provider to review them with you.      ASK your doctor about these medications        Instructions    Morning Afternoon Evening Bedtime    ibuprofen 200 MG Tabs   Commonly known as:  MOTRIN        Take 1,000 mg by mouth every 6 hours as needed.   Dose:  1000 mg                             Where to Get Your Medications      You can get these medications from any pharmacy     Bring a paper prescription for each of these medications    - oxycodone-acetaminophen 5-325 MG Tabs            Procedures and tests performed during your visit     DX-ANKLE 3+ VIEWS RIGHT        Discharge Instructions       Rest ice and elevate the leg. Use your walking boot and crutches and keep weight off of the ankle until you are seen by orthopedics.          Patient Information     Patient Information    Following emergency treatment: all patient requiring follow-up care must return either to a private physician or a clinic if your condition worsens before you are able to obtain further medical attention, please return to the emergency room.     Billing Information    At [facility], we work to make the billing process streamlined for our patients.  Our Representatives are here to answer any questions you may have regarding your hospital bill.  If you have insurance coverage  and have supplied your insurance information to us, we will submit a claim to your insurer on your behalf.  Should you have any questions regarding your bill, we can be reached online or by phone as follows:  Online: You are able pay your bills online or live chat with our representatives about any billing questions you may have. We are here to help Monday - Friday from 8:00am to 7:30pm and 9:00am - 12:00pm on Saturdays.  Please visit [email or web address]  for more information.   Phone:  [phone] or [phone]    Please note that your emergency physician, surgeon, pathologist, radiologist, anesthesiologist, and other specialists are not employed by [facility] and will therefore bill separately for their services.  Please contact them directly for any questions concerning their bills at the numbers below:     Emergency Physician Services:  [phone]  [facility]:  [phone]  [facility]:  [phone]  [facility]:  [phone]    1. Your final bill may vary from the amount quoted upon discharge if all procedures are not complete at that time, or if your doctor has additional procedures of which we are not aware. You will receive an additional bill if you return to the Emergency Department at [facility] for suture removal regardless of the facility of which the sutures were placed.     2. Please arrange for settlement of this account at the emergency registration.    3. All self-pay accounts are due in full at the time of treatment.  If you are unable to meet this obligation then payment is expected within 4-5 days.     4. If you have had radiology studies (CT, X-ray, Ultrasound, MRI), you have received a preliminary result during your emergency department visit. Please contact the radiology department [phone] to receive a copy of your final result. Please discuss the Final result with your primary physician or with  the follow up physician provided.     Crisis Hotline:  [place] Crisis Hotline:  1-128-SBAFMLO or 1-514.985.2120  [place] Crisis Hotline:    1-408.217.9778 or 028-406-4171         ED Discharge Follow Up Questions    1. In order to provide you with very good care, we would like to follow up with a phone call in the next few days.  May we have your permission to contact you?     YES /  NO    2. What is the best phone number to call you? (       )_____-__________    3. What is the best time to call you?      Morning  /  Afternoon  /  Evening                   Patient Signature:  ____________________________________________________________    Date:  ____________________________________________________________

## 2017-08-12 NOTE — ED AVS SNAPSHOT
8/12/2017    Ken Avilez  [address]  [facility] [address]    Dear Ken:    [facility] wants to ensure your discharge home is safe and you or your loved ones have had all of your questions answered regarding your care after you leave the hospital.    Below is a list of resources and contact information should you have any questions regarding your hospital stay, follow-up instructions, or active medical symptoms.    Questions or Concerns Regarding… Contact   Medical Questions Related to Your Discharge  (7 days a week, 8am-5pm) Contact a Nurse Care Coordinator   [phone]   Medical Questions Not Related to Your Discharge  (24 hours a day / 7 days a week)  Contact the Nurse Health Line   [phone]    Medications or Discharge Instructions Refer to your discharge packet   or contact your [facility] Primary Care Provider   [phone]   Follow-up Appointment(s) Schedule your appointment via [email or web address]   or contact Scheduling [phone]   Billing Review your statement via [email or web address]  or contact Billing [phone]   Medical Records Review your records via [email or web address]   or contact Medical Records [phone]     You may receive a telephone call within two days of discharge. This call is to make certain you understand your discharge instructions and have the opportunity to have any questions answered. You can also easily access your medical information, test results and upcoming appointments via the [email or web address] free online health management tool. You can learn more and sign up at [email or web address]. For assistance setting up your [email or web address] account, please call [phone].    Once again, we want to ensure your discharge home is safe and that you have a clear understanding of any next steps in your care. If you have any questions or concerns, please do not hesitate to contact us, we are here for you. Thank you for choosing [facility] for your healthcare needs.    Sincerely,    Your [facility] Healthcare Team

## 2017-08-12 NOTE — ED AVS SNAPSHOT
TonZof Access Code: [email or web address]  Expires: 8/18/2017  4:12 AM    Your email address is not on file at [facility].  Email Addresses are required for you to sign up for TonZof, please contact [phone] to verify your personal information and to provide your email address prior to attempting to register for TonZof.    Ken Avilez  [address]  [place] [address]    TonZof  A secure, online tool to manage your health information     [facility]’s TonZof® is a secure, online tool that connects you to your personalized health information from the privacy of your home -- day or night - making it very easy for you to manage your healthcare. Once the activation process is completed, you can even access your medical information using the TonZof gloria, which is available for free in the Apple Gloria store or Google Play store.     To learn more about TonZof, visit [email or web address]    There are two levels of access available (as shown below):   My Chart Features  [facility] Primary Care Doctor [facility]  Specialists [facility]  Urgent  Care Non-[facility] Primary Care Doctor   Email your healthcare team securely and privately 24/7 X X X    Manage appointments: schedule your next appointment; view details of past/upcoming appointments X      Request prescription refills. X      View recent personal medical records, including lab and immunizations X X X X   View health record, including health history, allergies, medications X X X X   Read reports about your outpatient visits, procedures, consult and ER notes X X X X   See your discharge summary, which is a recap of your hospital and/or ER visit that includes your diagnosis, lab results, and care plan X X  X     How to register for TonZof:  Once your e-mail address has been verified, follow the following steps to sign up for TonZof.     1. Go to  [email or web address]  2. Click on the Sign Up Now box, which takes you to the New Member Sign Up page.  You will need to provide the following information:  a. Enter your Ac[employer]is Access Code exactly as it appears at the top of this page. (You will not need to use this code after you’ve completed the sign-up process. If you do not sign up before the expiration date, you must request a new code.)   b. Enter your date of birth.   c. Enter your home email address.   d. Click Submit, and follow the next screen’s instructions.  3. Create a Clipsourcet ID. This will be your Acronis login ID and cannot be changed, so think of one that is secure and easy to remember.  4. Create a Acronis password. You can change your password at any time.  5. Enter your Password Reset Question and Answer. This can be used at a later time if you forget your password.   6. Enter your e-mail address. This allows you to receive e-mail notifications when new information is available in Acronis.  7. Click Sign Up. You can now view your health information.    For assistance activating your Acronis account, call [phone]

## 2017-08-13 ENCOUNTER — PATIENT OUTREACH (OUTPATIENT)
Dept: HEALTH INFORMATION MANAGEMENT | Facility: OTHER | Age: 37
End: 2017-08-13

## 2017-08-13 NOTE — DISCHARGE INSTRUCTIONS
Rest ice and elevate the leg. Use your walking boot and crutches and keep weight off of the ankle until you are seen by orthopedics.

## 2017-08-13 NOTE — ED NOTES
RN educated pt on discharge instructions, assisted pt with right ankle splint, pt ambulated to lobby with friend.

## 2017-08-13 NOTE — ED NOTES
Pt to triage c/o right ankle pain.  Pt fell off a curb yesterday. HX of ankle injury.   Pt advised to return to triage nurse for any changes or concerns.

## 2017-08-13 NOTE — ED PROVIDER NOTES
"ED Provider Note    CHIEF COMPLAINT  Chief Complaint   Patient presents with   • Ankle Pain       HPI  Ken Avilez is a 37 y.o. male who presents to the emergency department complaining of right ankle pain. The patient says that 2 months ago he had surgery on the right ankle for a fracture and he has been healing fairly well his last office visit with Dr. Luna was on the 4th of this month. Unfortunately yesterday he stepped off of a curb in an awkward way and twisted his right ankle and now he has recurrent pain and some swelling over the lateral aspect of the ankle so is coming to the emergency department for evaluation.    REVIEW OF SYSTEMS he did not otherwise injure himself he did not strike his head or sustain trauma to the other extremities, the patient has remained ambulatory using a cane    PAST MEDICAL HISTORY  Past Medical History   Diagnosis Date   • Heart burn    • Indigestion    • Snoring    • Renal disorder      stones   • Hypertension      did not take Rx  as ordered   • Asthma      \"I don't use an inhaler, only used one once\"-Never diagnosed   • Bronchitis    • Sleep apnea      occas stops breathing in sleep has not been tested   • Anesthesia      slow to wake up       FAMILY HISTORY  No family history on file.    SOCIAL HISTORY  Social History     Social History   • Marital Status:      Spouse Name: N/A   • Number of Children: N/A   • Years of Education: N/A     Social History Main Topics   • Smoking status: Current Every Day Smoker -- 1.00 packs/day for 21 years     Types: Cigarettes   • Smokeless tobacco: Never Used      Comment: ppd   • Alcohol Use: Yes      Comment: occasional   • Drug Use: No   • Sexual Activity: Not on file     Other Topics Concern   • Not on file     Social History Narrative       SURGICAL HISTORY  Past Surgical History   Procedure Laterality Date   • Cystoscopy stent placement Right 9/2/2015     Procedure: CYSTOSCOPY STENT PLACEMENT;  Surgeon: Arnulfo EPPS" "MARY JANE Mata;  Location: SURGERY [facility];  Service:    • Ureteroscopy Right 9/2/2015     Procedure: URETEROSCOPY;  Surgeon: Arnulfo Mata M.D.;  Location: SURGERY [facility];  Service:    • Lasertripsy Right 9/2/2015     Procedure: LASER LITHOTRIPSY;  Surgeon: Arnulfo Mata M.D.;  Location: SURGERY [facility];  Service:    • Ankle orif Right 6/2/2017     Procedure: ANKLE ORIF;  Surgeon: Mike Luna M.D.;  Location: SURGERY [facility];  Service:        CURRENT MEDICATIONS  Home Medications     **Home medications have not yet been reviewed for this encounter**          ALLERGIES  Allergies   Allergen Reactions   • Hydrocodone-Acetaminophen Nausea     (Vicodin)  Cold sweats and I pass out.        PHYSICAL EXAM  VITAL SIGNS: /89 mmHg  Pulse 95  Temp(Src) 37.6 °C (99.6 °F) (Temporal)  Resp 18  Ht 1.829 m (6' 0.01\")  Wt 130.182 kg (287 lb)  BMI 38.92 kg/m2  SpO2 97%   Oxygen saturation is interpreted as adequate  Constitutional: Awake and generally well-appearing individual in no distress  Musculoskeletal: There is very slight soft tissue swelling to the lateral aspect of the ankle and the well-healed surgical incisions in that area. There is no acute bony deformity there's some tenderness laterally there is a strong dorsalis pedis pulse the patient can wiggle his toes  Neurologic: Sensation is intact in the foot      Radiology  DX-ANKLE 3+ VIEWS RIGHT   Final Result      Post surgical changes of the distal tibia and fibula.      Soft tissue swelling.      No new fracture or dislocation is identified.        MEDICAL DECISION MAKING and DISPOSITION  In the emergency department the patient was given one Percocet tablet for pain, I reviewed the findings with him I given him a copy of the radiology report. The patient was placed in an ankle air splint he has a walking boot and crutches at home and he has his cane with him tonight. The patient is instructed to rest ice and elevate " the ankle, he is to call Dr. Luna's office in the morning and arrange office recheck during the week and I have written a prescription for Percocet    IMPRESSION  1. Right ankle injury         Electronically signed by: Baljeet Greenberg, 8/12/2017 8:37 PM

## 2017-08-13 NOTE — PROGRESS NOTES
· Placed discharge outreach phone call to patient s/p ER discharge 8/12/17.  Left voicemail providing my contact information and instructions to call with any questions or concerns.

## 2017-08-21 ENCOUNTER — OFFICE VISIT (OUTPATIENT)
Dept: URGENT CARE | Facility: CLINIC | Age: 37
End: 2017-08-21
Payer: COMMERCIAL

## 2017-08-21 VITALS
BODY MASS INDEX: 37.94 KG/M2 | RESPIRATION RATE: 16 BRPM | DIASTOLIC BLOOD PRESSURE: 84 MMHG | OXYGEN SATURATION: 93 % | HEART RATE: 113 BPM | TEMPERATURE: 99 F | WEIGHT: 271 LBS | HEIGHT: 71 IN | SYSTOLIC BLOOD PRESSURE: 134 MMHG

## 2017-08-21 DIAGNOSIS — J40 BRONCHITIS: Primary | ICD-10-CM

## 2017-08-21 DIAGNOSIS — J01.40 ACUTE NON-RECURRENT PANSINUSITIS: ICD-10-CM

## 2017-08-21 DIAGNOSIS — J06.9 URI WITH COUGH AND CONGESTION: ICD-10-CM

## 2017-08-21 PROCEDURE — 99204 OFFICE O/P NEW MOD 45 MIN: CPT | Performed by: PHYSICIAN ASSISTANT

## 2017-08-21 RX ORDER — METHYLPREDNISOLONE 4 MG/1
TABLET ORAL
Qty: 21 TAB | Refills: 0 | Status: SHIPPED | OUTPATIENT
Start: 2017-08-21 | End: 2017-08-22 | Stop reason: SDUPTHER

## 2017-08-21 RX ORDER — DOXYCYCLINE HYCLATE 100 MG
100 TABLET ORAL 2 TIMES DAILY
Qty: 14 TAB | Refills: 0 | Status: SHIPPED | OUTPATIENT
Start: 2017-08-21 | End: 2017-08-22 | Stop reason: SDUPTHER

## 2017-08-21 RX ORDER — PROMETHAZINE HYDROCHLORIDE AND CODEINE PHOSPHATE 6.25; 1 MG/5ML; MG/5ML
5 SYRUP ORAL 4 TIMES DAILY PRN
Qty: 240 ML | Refills: 0 | Status: SHIPPED | OUTPATIENT
Start: 2017-08-21 | End: 2017-09-04

## 2017-08-21 RX ORDER — ALBUTEROL SULFATE 90 UG/1
2 AEROSOL, METERED RESPIRATORY (INHALATION) EVERY 6 HOURS PRN
Qty: 8.5 G | Refills: 0 | Status: SHIPPED | OUTPATIENT
Start: 2017-08-21 | End: 2017-08-22 | Stop reason: SDUPTHER

## 2017-08-21 ASSESSMENT — ENCOUNTER SYMPTOMS: FEVER: 1

## 2017-08-21 NOTE — MR AVS SNAPSHOT
"        Ken Avilez   2017 6:15 PM   Office Visit   MRN: [id number]    Department:  [facility]   Dept Phone:  [phone]    Description:  Male : 1980   Provider:  Samuel Presley PA-C           Reason for Visit     Fever x 4 days with body aches, chills and concern about bronchitis from history of bronchitis      Allergies as of 2017     Allergen Noted Reactions    Hydrocodone-Acetaminophen 2008   Nausea    (Vicodin)  Cold sweats and I pass out.       You were diagnosed with     Bronchitis   [889120]  -  Primary     Acute non-recurrent pansinusitis   [[id number]]       URI with cough and congestion   [[id number]]         Vital Signs     Blood Pressure Pulse Temperature Respirations Height Weight    134/84 mmHg 113 37.2 °C (99 °F) 16 1.803 m (5' 10.98\") 122.925 kg (271 lb)    Body Mass Index Oxygen Saturation Smoking Status             37.81 kg/m2 93% Current Every Day Smoker         Basic Information     Date Of Birth Sex Race Ethnicity Preferred Language    1980 Male White Non- English      Problem List              ICD-10-CM Priority Class Noted - Resolved    Calculus of ureter N20.1   2015 - Present    Other fracture of upper and lower end of right fibula, initial encounter for closed fracture S82.831A   2017 - Present      Health Maintenance        Date Due Completion Dates    IMM DTaP/Tdap/Td Vaccine (1 - Tdap) 3/26/1999 ---    IMM INFLUENZA (1) 2017 ---            Current Immunizations     No immunizations on file.      Below and/or attached are the medications your provider expects you to take. Review all of your home medications and newly ordered medications with your provider and/or pharmacist. Follow medication instructions as directed by your provider and/or pharmacist. Please keep your medication list with you and share with your provider. Update the information when medications are discontinued, doses are changed, or new medications (including " over-the-counter products) are added; and carry medication information at all times in the event of emergency situations     Allergies:  HYDROCODONE-ACETAMINOPHEN - Nausea               Medications  Valid as of: August 21, 2017 -  6:16 PM    Generic Name Brand Name Tablet Size Instructions for use    Albuterol Sulfate (Aero Soln) albuterol 108 (90 Base) MCG/ACT Inhale 2 Puffs by mouth every 6 hours as needed for Shortness of Breath for up to 10 days.        Doxycycline Hyclate (Tab) VIBRAMYCIN 100 MG Take 1 Tab by mouth 2 times a day for 7 days.        Ibuprofen (Tab) MOTRIN 200 MG Take 1,000 mg by mouth every 6 hours as needed.        MethylPREDNISolone (Tablet Therapy Pack) MEDROL DOSEPAK 4 MG Use as directed        Promethazine-Codeine (Syrup) PHENERGAN-CODEINE 6.25-10 MG/5ML Take 5 mL by mouth 4 times a day as needed for up to 14 days.        .                 Medicines prescribed today were sent to:     [facility] [address] [place] [address] [facility] [place] - [address] [facility] [address]    [address] [place] [address]    Phone: [phone] Fax: [phone]    Open 24 Hours?: No      Medication refill instructions:       If your prescription bottle indicates you have medication refills left, it is not necessary to call your provider’s office. Please contact your pharmacy and they will refill your medication.    If your prescription bottle indicates you do not have any refills left, you may request refills at any time through one of the following ways: The online appCREAR system (except Urgent Care), by calling your provider’s office, or by asking your pharmacy to contact your provider’s office with a refill request. Medication refills are processed only during regular business hours and may not be available until the next business day. Your provider may request additional information or to have a follow-up visit with you prior to refilling your medication.   *Please Note: Medication refills are  assigned a new Rx number when refilled electronically. Your pharmacy may indicate that no refills were authorized even though a new prescription for the same medication is available at the pharmacy. Please request the medicine by name with the pharmacy before contacting your provider for a refill.        Instructions    Acute Bronchitis  Bronchitis is inflammation of the airways that extend from the windpipe into the lungs (bronchi). The inflammation often causes mucus to develop. This leads to a cough, which is the most common symptom of bronchitis.   In acute bronchitis, the condition usually develops suddenly and goes away over time, usually in a couple weeks. Smoking, allergies, and asthma can make bronchitis worse. Repeated episodes of bronchitis may cause further lung problems.   CAUSES  Acute bronchitis is most often caused by the same virus that causes a cold. The virus can spread from person to person (contagious) through coughing, sneezing, and touching contaminated objects.  SIGNS AND SYMPTOMS   · Cough.    · Fever.    · Coughing up mucus.    · Body aches.    · Chest congestion.    · Chills.    · Shortness of breath.    · Sore throat.    DIAGNOSIS   Acute bronchitis is usually diagnosed through a physical exam. Your health care provider will also ask you questions about your medical history. Tests, such as chest X-rays, are sometimes done to rule out other conditions.   TREATMENT   Acute bronchitis usually goes away in a couple weeks. Oftentimes, no medical treatment is necessary. Medicines are sometimes given for relief of fever or cough. Antibiotic medicines are usually not needed but may be prescribed in certain situations. In some cases, an inhaler may be recommended to help reduce shortness of breath and control the cough. A cool mist vaporizer may also be used to help thin bronchial secretions and make it easier to clear the chest.   HOME CARE INSTRUCTIONS  · Get plenty of rest.    · Drink enough  fluids to keep your urine clear or pale yellow (unless you have a medical condition that requires fluid restriction). Increasing fluids may help thin your respiratory secretions (sputum) and reduce chest congestion, and it will prevent dehydration.    · Take medicines only as directed by your health care provider.  · If you were prescribed an antibiotic medicine, finish it all even if you start to feel better.  · Avoid smoking and secondhand smoke. Exposure to cigarette smoke or irritating chemicals will make bronchitis worse. If you are a smoker, consider using nicotine gum or skin patches to help control withdrawal symptoms. Quitting smoking will help your lungs heal faster.    · Reduce the chances of another bout of acute bronchitis by washing your hands frequently, avoiding people with cold symptoms, and trying not to touch your hands to your mouth, nose, or eyes.    · Keep all follow-up visits as directed by your health care provider.    SEEK MEDICAL CARE IF:  Your symptoms do not improve after 1 week of treatment.   SEEK IMMEDIATE MEDICAL CARE IF:  · You develop an increased fever or chills.    · You have chest pain.    · You have severe shortness of breath.  · You have bloody sputum.    · You develop dehydration.  · You faint or repeatedly feel like you are going to pass out.  · You develop repeated vomiting.  · You develop a severe headache.  MAKE SURE YOU:   · Understand these instructions.  · Will watch your condition.  · Will get help right away if you are not doing well or get worse.     This information is not intended to replace advice given to you by your health care provider. Make sure you discuss any questions you have with your health care provider.     Document Released: 01/25/2006 Document Revised: 01/08/2016 Document Reviewed: 06/10/2014  [employer] Interactive Patient Education ©2016 Elsevier Inc.            [employer] Access Code: [email or web address]  Expires: 9/16/2017  4:12 AM    [employer]  ROSALIO secure,  online tool to manage your health information     [employer]’s [employer]® is a secure, online tool that connects you to your personalized health information from the privacy of your home -- day or night - making it very easy for you to manage your healthcare. Once the activation process is completed, you can even access your medical information using the [employer] gloria, which is available for free in the Apple Gloria store or Google Play store.     [employer] provides the following levels of access (as shown below):   My Chart Features   [facility] Doctor [facility]  Specialists [facility]  Urgent  Care [facility]  Primary Care  Doctor   Email your healthcare team securely and privately 24/7 X X X    Manage appointments: schedule your next appointment; view details of past/upcoming appointments X      Request prescription refills. X      View recent personal medical records, including lab and immunizations X X X X   View health record, including health history, allergies, medications X X X X   Read reports about your outpatient visits, procedures, consult and ER notes X X X X   See your discharge summary, which is a recap of your hospital and/or ER visit that includes your diagnosis, lab results, and care plan. X X       How to register for [employer]:  1. Go to  [email or web address].  2. Click on the Sign Up Now box, which takes you to the New Member Sign Up page. You will need to provide the following information:  a. Enter your [employer] Access Code exactly as it appears at the top of this page. (You will not need to use this code after you’ve completed the sign-up process. If you do not sign up before the expiration date, you must request a new code.)   b. Enter your date of birth.   c. Enter your home email address.   d. Click Submit, and follow the next screen’s instructions.  3. Create a [employer] ID. This will be your [employer] login ID and cannot be changed, so think of one that is secure and easy to  remember.  4. Create a [employer] password. You can change your password at any time.  5. Enter your Password Reset Question and Answer. This can be used at a later time if you forget your password.   6. Enter your e-mail address. This allows you to receive e-mail notifications when new information is available in [employer].  7. Click Sign Up. You can now view your health information.    For assistance activating your [employer] account, call [phone]        Quit Tobacco Information     Do you want to quit using tobacco?    Quitting tobacco decreases risks of cancer, heart and lung disease, increases life expectancy, improves sense of taste and smell, and increases spending money, among other benefits.    If you are thinking about quitting, we can help.  • [facility] Quit Tobacco Program: 961.759.7712  o Program occurs weekly for four weeks and includes pharmacist consultation on products to support quitting smoking or chewing tobacco. A provider referral is needed for pharmacist consultation.  • Tobacco Users Help Hotline: 6-800-QUIT-NOW (989-0159) or https://[place].quitlogix.org/  o Free, confidential telephone and online coaching for [place] residents. Sessions are designed on a schedule that is convenient for you. Eligible clients receive free nicotine replacement therapy.  • Nationally: www.smokefree.gov  o Information and professional assistance to support both immediate and long-term needs as you become, and remain, a non-smoker. Smokefree.gov allows you to choose the help that best fits your needs.

## 2017-08-22 RX ORDER — METHYLPREDNISOLONE 4 MG/1
TABLET ORAL
Qty: 21 TAB | Refills: 0 | Status: SHIPPED | OUTPATIENT
Start: 2017-08-22 | End: 2022-06-21

## 2017-08-22 RX ORDER — DOXYCYCLINE HYCLATE 100 MG
100 TABLET ORAL 2 TIMES DAILY
Qty: 14 TAB | Refills: 0 | Status: SHIPPED | OUTPATIENT
Start: 2017-08-22 | End: 2017-08-29

## 2017-08-22 RX ORDER — ALBUTEROL SULFATE 90 UG/1
2 AEROSOL, METERED RESPIRATORY (INHALATION) EVERY 6 HOURS PRN
Qty: 8.5 G | Refills: 0 | Status: SHIPPED | OUTPATIENT
Start: 2017-08-22 | End: 2017-09-01

## 2017-08-22 NOTE — PATIENT INSTRUCTIONS
Acute Bronchitis  Bronchitis is inflammation of the airways that extend from the windpipe into the lungs (bronchi). The inflammation often causes mucus to develop. This leads to a cough, which is the most common symptom of bronchitis.   In acute bronchitis, the condition usually develops suddenly and goes away over time, usually in a couple weeks. Smoking, allergies, and asthma can make bronchitis worse. Repeated episodes of bronchitis may cause further lung problems.   CAUSES  Acute bronchitis is most often caused by the same virus that causes a cold. The virus can spread from person to person (contagious) through coughing, sneezing, and touching contaminated objects.  SIGNS AND SYMPTOMS   · Cough.    · Fever.    · Coughing up mucus.    · Body aches.    · Chest congestion.    · Chills.    · Shortness of breath.    · Sore throat.    DIAGNOSIS   Acute bronchitis is usually diagnosed through a physical exam. Your health care provider will also ask you questions about your medical history. Tests, such as chest X-rays, are sometimes done to rule out other conditions.   TREATMENT   Acute bronchitis usually goes away in a couple weeks. Oftentimes, no medical treatment is necessary. Medicines are sometimes given for relief of fever or cough. Antibiotic medicines are usually not needed but may be prescribed in certain situations. In some cases, an inhaler may be recommended to help reduce shortness of breath and control the cough. A cool mist vaporizer may also be used to help thin bronchial secretions and make it easier to clear the chest.   HOME CARE INSTRUCTIONS  · Get plenty of rest.    · Drink enough fluids to keep your urine clear or pale yellow (unless you have a medical condition that requires fluid restriction). Increasing fluids may help thin your respiratory secretions (sputum) and reduce chest congestion, and it will prevent dehydration.    · Take medicines only as directed by your health care provider.  · If  you were prescribed an antibiotic medicine, finish it all even if you start to feel better.  · Avoid smoking and secondhand smoke. Exposure to cigarette smoke or irritating chemicals will make bronchitis worse. If you are a smoker, consider using nicotine gum or skin patches to help control withdrawal symptoms. Quitting smoking will help your lungs heal faster.    · Reduce the chances of another bout of acute bronchitis by washing your hands frequently, avoiding people with cold symptoms, and trying not to touch your hands to your mouth, nose, or eyes.    · Keep all follow-up visits as directed by your health care provider.    SEEK MEDICAL CARE IF:  Your symptoms do not improve after 1 week of treatment.   SEEK IMMEDIATE MEDICAL CARE IF:  · You develop an increased fever or chills.    · You have chest pain.    · You have severe shortness of breath.  · You have bloody sputum.    · You develop dehydration.  · You faint or repeatedly feel like you are going to pass out.  · You develop repeated vomiting.  · You develop a severe headache.  MAKE SURE YOU:   · Understand these instructions.  · Will watch your condition.  · Will get help right away if you are not doing well or get worse.     This information is not intended to replace advice given to you by your health care provider. Make sure you discuss any questions you have with your health care provider.     Document Released: 01/25/2006 Document Revised: 01/08/2016 Document Reviewed: 06/10/2014  [employer] Interactive Patient Education ©2016 [employer]

## 2018-01-29 VITALS
WEIGHT: 294.53 LBS | RESPIRATION RATE: 18 BRPM | OXYGEN SATURATION: 96 % | DIASTOLIC BLOOD PRESSURE: 115 MMHG | SYSTOLIC BLOOD PRESSURE: 220 MMHG | TEMPERATURE: 97.3 F | HEART RATE: 110 BPM | BODY MASS INDEX: 41.23 KG/M2 | HEIGHT: 71 IN

## 2018-01-29 PROCEDURE — 302449 STATCHG TRIAGE ONLY (STATISTIC)

## 2018-01-29 ASSESSMENT — PAIN SCALES - GENERAL: PAINLEVEL_OUTOF10: 7

## 2018-01-30 ENCOUNTER — APPOINTMENT (OUTPATIENT)
Dept: RADIOLOGY | Facility: IMAGING CENTER | Age: 38
End: 2018-01-30
Attending: EMERGENCY MEDICINE
Payer: COMMERCIAL

## 2018-01-30 ENCOUNTER — HOSPITAL ENCOUNTER (EMERGENCY)
Facility: MEDICAL CENTER | Age: 38
End: 2018-01-30
Payer: COMMERCIAL

## 2018-01-30 ENCOUNTER — OFFICE VISIT (OUTPATIENT)
Dept: URGENT CARE | Facility: CLINIC | Age: 38
End: 2018-01-30
Payer: COMMERCIAL

## 2018-01-30 VITALS
SYSTOLIC BLOOD PRESSURE: 176 MMHG | OXYGEN SATURATION: 98 % | TEMPERATURE: 100 F | HEART RATE: 98 BPM | DIASTOLIC BLOOD PRESSURE: 98 MMHG | WEIGHT: 287 LBS | HEIGHT: 70 IN | RESPIRATION RATE: 16 BRPM | BODY MASS INDEX: 41.09 KG/M2

## 2018-01-30 DIAGNOSIS — M25.571 RIGHT ANKLE PAIN, UNSPECIFIED CHRONICITY: ICD-10-CM

## 2018-01-30 DIAGNOSIS — Z87.81 HISTORY OF FIBULA FRACTURE: ICD-10-CM

## 2018-01-30 PROCEDURE — 73610 X-RAY EXAM OF ANKLE: CPT | Mod: TC,RT | Performed by: EMERGENCY MEDICINE

## 2018-01-30 PROCEDURE — 99213 OFFICE O/P EST LOW 20 MIN: CPT | Performed by: EMERGENCY MEDICINE

## 2018-01-30 ASSESSMENT — ENCOUNTER SYMPTOMS
TINGLING: 1
LOSS OF SENSATION: 0
MUSCLE WEAKNESS: 0
SENSORY CHANGE: 1
FOCAL WEAKNESS: 0
CHILLS: 0
FEVER: 0
LOSS OF MOTION: 0
NUMBNESS: 1
INABILITY TO BEAR WEIGHT: 0

## 2018-01-30 NOTE — ED TRIAGE NOTES
"Ken Avilez  37 y.o.  Chief Complaint   Patient presents with   • Leg Pain     sx 5/2017 on RLE; x2 days increasingly worsening pain, unable to tolerate at this time.      BP (!) 220/115   Pulse (!) 110   Temp 36.3 °C (97.3 °F)   Resp 18   Ht 1.803 m (5' 11\")   Wt (!) 133.6 kg (294 lb 8.6 oz)   SpO2 96%   BMI 41.08 kg/m²     Explained wait time and triage process to pt. Pt placed back out in lobby, told to notify ED tech or triage RN of any changes, verbalized understanding.    "

## 2018-01-31 NOTE — PROGRESS NOTES
Subjective:      Ken Avilez is a 37 y.o. male who presents with Ankle Pain (r ankle pain x 3 weeks . pt broke r ankle last summer , is concerned about hardware . )            Ankle Pain    Incident onset: several weeks. There was no injury mechanism. The pain is present in the right ankle. The pain has been worsening since onset. Associated symptoms include numbness and tingling. Pertinent negatives include no inability to bear weight, loss of motion, loss of sensation or muscle weakness. The symptoms are aggravated by weight bearing and palpation. He has tried rest and NSAIDs for the symptoms. The treatment provided no relief.       Review of Systems   Constitutional: Negative for chills and fever.   Musculoskeletal:        No consistent pain proximal or distal to the site.   Skin: Negative for rash.   Neurological: Positive for tingling, sensory change and numbness. Negative for focal weakness.     PMH:  has a past medical history of Anesthesia; Asthma; Bronchitis; Heart burn; Hypertension; Indigestion; Renal disorder; Sleep apnea; and Snoring.  MEDS:   Current Outpatient Prescriptions:   •  Liniments (SALONPAS EX), by Apply externally route., Disp: , Rfl:   •  ibuprofen (MOTRIN) 200 MG Tab, Take 1,000 mg by mouth every 6 hours as needed., Disp: , Rfl:   •  MethylPREDNISolone (MEDROL DOSEPAK) 4 MG Tablet Therapy Pack, Use as directed, Disp: 21 Tab, Rfl: 0  ALLERGIES:   Allergies   Allergen Reactions   • Hydrocodone-Acetaminophen Nausea     (Vicodin)  Cold sweats and I pass out.      SURGHX:   Past Surgical History:   Procedure Laterality Date   • ANKLE ORIF Right 6/2/2017    Procedure: ANKLE ORIF;  Surgeon: Mike Luna M.D.;  Location: SURGERY [facility];  Service:    • CYSTOSCOPY STENT PLACEMENT Right 9/2/2015    Procedure: CYSTOSCOPY STENT PLACEMENT;  Surgeon: Arnulfo Mata M.D.;  Location: SURGERY [facility];  Service:    • URETEROSCOPY Right 9/2/2015    Procedure: URETEROSCOPY;   "Surgeon: Arnulfo Mata M.D.;  Location: SURGERY [facility];  Service:    • LASERTRIPSY Right 9/2/2015    Procedure: LASER LITHOTRIPSY;  Surgeon: Arnulfo Mata M.D.;  Location: SURGERY [facility];  Service:      SOCHX:  reports that he has been smoking Cigarettes.  He has a 21.00 pack-year smoking history. He uses smokeless tobacco. He reports that he drinks alcohol. He reports that he does not use drugs.  FH: family history is not on file.         Objective:     BP (!) 176/98   Pulse 98   Temp 37.8 °C (100 °F)   Resp 16   Ht 1.778 m (5' 10\")   Wt (!) 130.2 kg (287 lb)   SpO2 98%   BMI 41.18 kg/m²      Physical Exam   Constitutional: He appears well-developed and well-nourished. He is cooperative. He does not have a sickly appearance. No distress.   Cardiovascular:   Pulses:       Dorsalis pedis pulses are 2+ on the right side.   No significant pedal edema.   Musculoskeletal:        Right ankle: He exhibits decreased range of motion. He exhibits no swelling and no ecchymosis. Tenderness. Lateral malleolus tenderness found. No medial malleolus, no head of 5th metatarsal and no proximal fibula tenderness found. Achilles tendon exhibits no pain and no defect.   Lymphadenopathy:   No lymphangitis.   Neurological: He is alert.   Distal sensation to light touch and pressure intact.   Skin: Skin is warm, dry and intact. No rash noted.   Psychiatric: He has a normal mood and affect.          Advised recommendation for elevated blood pressure reevaluation with PCP.     Assessment/Plan:     1. Right ankle pain, unspecified chronicity  Advised walking boot splint, F/U orthopedist.  - DX-ANKLE 3+ VIEWS RIGHT; per radiologist:  No acute fracture.  Increasing lucency about the tibiofibular screw/possible loosening.    2. History of fibula fracture      "

## 2018-08-21 ENCOUNTER — APPOINTMENT (OUTPATIENT)
Dept: RADIOLOGY | Facility: MEDICAL CENTER | Age: 38
End: 2018-08-21
Attending: EMERGENCY MEDICINE
Payer: COMMERCIAL

## 2018-08-21 ENCOUNTER — HOSPITAL ENCOUNTER (EMERGENCY)
Facility: MEDICAL CENTER | Age: 38
End: 2018-08-21
Attending: EMERGENCY MEDICINE
Payer: COMMERCIAL

## 2018-08-21 VITALS
WEIGHT: 301.37 LBS | BODY MASS INDEX: 43.24 KG/M2 | RESPIRATION RATE: 16 BRPM | DIASTOLIC BLOOD PRESSURE: 120 MMHG | OXYGEN SATURATION: 94 % | SYSTOLIC BLOOD PRESSURE: 181 MMHG | HEART RATE: 80 BPM | TEMPERATURE: 97.8 F

## 2018-08-21 DIAGNOSIS — S46.102A INJURY OF TENDON OF LONG HEAD OF LEFT BICEPS, INITIAL ENCOUNTER: ICD-10-CM

## 2018-08-21 PROCEDURE — 700102 HCHG RX REV CODE 250 W/ 637 OVERRIDE(OP): Performed by: EMERGENCY MEDICINE

## 2018-08-21 PROCEDURE — 99284 EMERGENCY DEPT VISIT MOD MDM: CPT

## 2018-08-21 PROCEDURE — A9270 NON-COVERED ITEM OR SERVICE: HCPCS | Performed by: EMERGENCY MEDICINE

## 2018-08-21 PROCEDURE — 73060 X-RAY EXAM OF HUMERUS: CPT | Mod: LT

## 2018-08-21 RX ORDER — IBUPROFEN 600 MG/1
600 TABLET ORAL ONCE
Status: COMPLETED | OUTPATIENT
Start: 2018-08-21 | End: 2018-08-21

## 2018-08-21 RX ADMIN — IBUPROFEN 600 MG: 600 TABLET, FILM COATED ORAL at 21:19

## 2018-08-21 ASSESSMENT — PAIN SCALES - GENERAL: PAINLEVEL_OUTOF10: 8

## 2018-08-22 NOTE — ED PROVIDER NOTES
"ED Provider Note    CHIEF COMPLAINT  Chief Complaint   Patient presents with   • Arm Pain       HPI  Ken Avilez is a 38 y.o. male who presents for evaluation of acute injury to the left arm area he was lifting a heavy object and felt a snapping sensation and immediately felt pain in his left bicep and could palpate a mass in the proximal arm. He denies any numbness weakness or tingling. He has no other complaints. Injury occurred earlier today. No bony pain.    REVIEW OF SYSTEMS  See HPI for further details. No high fevers chills measures weight loss numbness tingling or weakness All other systems are negative.     PAST MEDICAL HISTORY  Past Medical History:   Diagnosis Date   • Anesthesia     slow to wake up   • Asthma     \"I don't use an inhaler, only used one once\"-Never diagnosed   • Bronchitis    • Heart burn    • Hypertension     did not take Rx  as ordered   • Indigestion    • Renal disorder     stones   • Sleep apnea     occas stops breathing in sleep has not been tested   • Snoring        FAMILY HISTORY  Noncontributory    SOCIAL HISTORY  Social History     Social History   • Marital status:      Spouse name: N/A   • Number of children: N/A   • Years of education: N/A     Social History Main Topics   • Smoking status: Current Every Day Smoker     Packs/day: 1.00     Years: 21.00     Types: Cigarettes   • Smokeless tobacco: Never Used      Comment: ppd   • Alcohol use Yes      Comment: occasional   • Drug use: No   • Sexual activity: Not on file     Other Topics Concern   • Not on file     Social History Narrative   • No narrative on file     Denies IV drugs  SURGICAL HISTORY  Past Surgical History:   Procedure Laterality Date   • ANKLE ORIF Right 6/2/2017    Procedure: ANKLE ORIF;  Surgeon: Mike Luna M.D.;  Location: SURGERY [facility];  Service:    • CYSTOSCOPY STENT PLACEMENT Right 9/2/2015    Procedure: CYSTOSCOPY STENT PLACEMENT;  Surgeon: Arnulfo Mata M.D.;  Location: " SURGERY [facility];  Service:    • URETEROSCOPY Right 9/2/2015    Procedure: URETEROSCOPY;  Surgeon: Arnulfo Mata M.D.;  Location: SURGERY [facility];  Service:    • LASERTRIPSY Right 9/2/2015    Procedure: LASER LITHOTRIPSY;  Surgeon: Arnulfo Mata M.D.;  Location: SURGERY [facility];  Service:        CURRENT MEDICATIONS  Home Medications     Reviewed by Rafaela Rose R.N. (Registered Nurse) on 08/21/18 at 2029  Med List Status: Complete   Medication Last Dose Status   ibuprofen (MOTRIN) 200 MG Tab not taking Active   Liniments (SALONPAS EX) not taking Active   MethylPREDNISolone (MEDROL DOSEPAK) 4 MG Tablet Therapy Pack not taking Active                ALLERGIES  Allergies   Allergen Reactions   • Hydrocodone-Acetaminophen Nausea     (Vicodin)  Cold sweats and I pass out.        PHYSICAL EXAM  VITAL SIGNS: BP (!) 189/120   Pulse 95   Temp 36.3 °C (97.4 °F)   Resp 14   Wt (!) 136.7 kg (301 lb 5.9 oz)   SpO2 96%   BMI 43.24 kg/m²  Room air O2: 96    Constitutional: Well developed, Well nourished, No acute distress, Non-toxic appearance.   HENT: Normocephalic, Atraumatic, Bilateral external ears normal, Oropharynx moist, No oral exudates, Nose normal.   Eyes: PERRLA, EOMI, Conjunctiva normal, No discharge.   Neck: Normal range of motion, No tenderness, Supple, No stridor.   Cardiovascular: Normal heart rate, Normal rhythm, No murmurs, No rubs, No gallops.   Thorax & Lungs: Normal breath sounds, No respiratory distress, No wheezing, No chest tenderness.   Abdomen: Bowel sounds normal, Soft, No tenderness, No masses, No pulsatile masses.   Skin: Warm, Dry, No erythema, No rash.   Extremities: Point tenderness over the bicep tendon and muscle. There is a palpable mass suggesting likely biceps tendon rupture. No bony tenderness neurovascular exam is normal   Neurologic: Alert & oriented x 3, Normal motor function, Normal sensory function, No focal deficits noted.   Psychiatric:  Anxious      RADIOLOGY/PROCEDURES  DX-HUMERUS 2+ LEFT   Final Result         1.  No acute traumatic bony injury.            COURSE & MEDICAL DECISION MAKING  Pertinent Labs & Imaging studies reviewed. (See chart for details)  Patient has a history and physical exam strongly suggest bicep tendon and/or bicep muscle tear. No evidence of fracture or neurovascular compromise on radiograph. We'll place him in an arm sling. He declined any narcotic pain medication. We administered some ibuprofen and he will be referred to regional orthopedic clinic for ongoing management    FINAL IMPRESSION  1.   1. Injury of tendon of long head of left biceps, initial encounter               Electronically signed by: Nacho Vargas, 8/21/2018 8:31 PM

## 2018-08-22 NOTE — ED NOTES
D/C home with written and verbal instructions re: Rx, activity, f/u.  Verbalizes understanding.  Ambulated out with steady gait. Patient placed in sling.

## 2018-08-22 NOTE — DISCHARGE INSTRUCTIONS
Tendon Injury  Tendons are strong, cordlike structures that connect muscle to bone. Tendons are made up of woven fibers, like a rope. A tendon injury is a tear (rupture) of the tendon. The rupture may be partial (only a few of the fibers in your tendon rupture) or complete (your entire tendon ruptures).  CAUSES   Tendon injuries can be caused by high-stress activities, such as sports. They also can be caused by a repetitive injury or by a single injury from an excessive, rapid force.  SYMPTOMS   Symptoms of tendon injury include pain when you move the joint close to the tendon. Other symptoms are swelling, redness, and warmth.  DIAGNOSIS   Tendon injuries often can be diagnosed by physical exam. However, sometimes an X-ray exam or advanced imaging, such as magnetic resonance imaging (MRI), is necessary to determine the extent of the injury.  TREATMENT   Partial tendon ruptures often can be treated with immobilization. A splint, bandage, or removable brace usually is used to immobilize the injured tendon. Most injured tendons need to be immobilized for 1-2 months before they are completely healed. Complete tendon ruptures may require surgical reattachment.     This information is not intended to replace advice given to you by your health care provider. Make sure you discuss any questions you have with your health care provider.     Document Released: 01/25/2006 Document Revised: 12/06/2012 Document Reviewed: 03/10/2013  [employer] Interactive Patient Education ©2016 [employer]

## 2018-08-22 NOTE — ED TRIAGE NOTES
PT ambulated to triage c/o lt arm pain.  PT reports he was moving something in his truck and heard a pop in his lt bicep area, pt reports he has no strength in the arm and cannot move it at this time  Chief Complaint   Patient presents with   • Arm Pain     Blood pressure (!) 189/120, pulse 95, temperature 36.3 °C (97.4 °F), resp. rate 14, weight (!) 136.7 kg (301 lb 5.9 oz), SpO2 96 %.

## 2019-09-24 ENCOUNTER — OFFICE VISIT (OUTPATIENT)
Dept: URGENT CARE | Facility: CLINIC | Age: 39
End: 2019-09-24
Payer: COMMERCIAL

## 2019-09-24 VITALS
RESPIRATION RATE: 20 BRPM | HEART RATE: 76 BPM | OXYGEN SATURATION: 95 % | WEIGHT: 312 LBS | SYSTOLIC BLOOD PRESSURE: 156 MMHG | TEMPERATURE: 97.4 F | DIASTOLIC BLOOD PRESSURE: 96 MMHG | HEIGHT: 72 IN | BODY MASS INDEX: 42.26 KG/M2

## 2019-09-24 DIAGNOSIS — S05.01XA ABRASION OF RIGHT CORNEA, INITIAL ENCOUNTER: ICD-10-CM

## 2019-09-24 PROCEDURE — 99214 OFFICE O/P EST MOD 30 MIN: CPT | Performed by: FAMILY MEDICINE

## 2019-09-24 RX ORDER — POLYMYXIN B SULFATE AND TRIMETHOPRIM 1; 10000 MG/ML; [USP'U]/ML
1 SOLUTION OPHTHALMIC 4 TIMES DAILY
Qty: 1 BOTTLE | Refills: 0 | Status: SHIPPED | OUTPATIENT
Start: 2019-09-24 | End: 2023-02-11

## 2019-09-24 ASSESSMENT — ENCOUNTER SYMPTOMS
EYE PAIN: 1
VOMITING: 0
PHOTOPHOBIA: 1
BLURRED VISION: 1
FEVER: 0
EYE DISCHARGE: 1
EYE REDNESS: 1
SHORTNESS OF BREATH: 0

## 2019-09-24 NOTE — PROGRESS NOTES
Subjective:     Ken Avilez is a 39 y.o. male who presents for Eye Problem (redness,swelling x 1 day,pt states sensitive to light )    HPI  Pt presents for evaluation of a new problem c/o right rt eye redness,clear  discharge, since yesterday  C/o pain, blurred vision resolves when he wipes the tears, c/o headache, wears contact lens, c/o photophobia  Started about an hour after he took out contact lens  Overall pain is sharp, stable and not worsening, and worse with bright light    Has not tried anything which makes it better or worse     Review of Systems   Constitutional: Negative for fever.   Eyes: Positive for blurred vision, photophobia, pain, discharge and redness.   Respiratory: Negative for shortness of breath.    Cardiovascular: Negative for chest pain.   Gastrointestinal: Negative for vomiting.   Skin: Negative for rash.       PMH:  has a past medical history of Anesthesia, Asthma, Bronchitis, Heart burn, Hypertension, Indigestion, Renal disorder, Sleep apnea, and Snoring.  MEDS:   Current Outpatient Medications:   •  polymixin-trimethoprim (POLYTRIM) 66344-4.1 UNIT/ML-% Solution, Place 1 Drop in right eye 4 times a day., Disp: 1 Bottle, Rfl: 0  •  Liniments (SALONPAS EX), by Apply externally route., Disp: , Rfl:   •  MethylPREDNISolone (MEDROL DOSEPAK) 4 MG Tablet Therapy Pack, Use as directed (Patient not taking: Reported on 9/24/2019), Disp: 21 Tab, Rfl: 0  •  ibuprofen (MOTRIN) 200 MG Tab, Take 1,000 mg by mouth every 6 hours as needed., Disp: , Rfl:   ALLERGIES:   Allergies   Allergen Reactions   • Hydrocodone-Acetaminophen Nausea     (Vicodin)  Cold sweats and I pass out.      SURGHX:   Past Surgical History:   Procedure Laterality Date   • ANKLE ORIF Right 6/2/2017    Procedure: ANKLE ORIF;  Surgeon: Mike Luna M.D.;  Location: SURGERY [facility];  Service:    • CYSTOSCOPY STENT PLACEMENT Right 9/2/2015    Procedure: CYSTOSCOPY STENT PLACEMENT;  Surgeon: Arnulfo Mata M.D.;   Location: SURGERY [facility];  Service:    • URETEROSCOPY Right 9/2/2015    Procedure: URETEROSCOPY;  Surgeon: Arnulfo Mata M.D.;  Location: SURGERY [facility];  Service:    • LASERTRIPSY Right 9/2/2015    Procedure: LASER LITHOTRIPSY;  Surgeon: Arnulfo Mata M.D.;  Location: SURGERY [facility];  Service:      SOCHX:  reports that he has been smoking cigarettes. He has a 21.00 pack-year smoking history. He has never used smokeless tobacco. He reports that he drinks alcohol. He reports that he does not use drugs.  FH: Family history was reviewed, not contributing to acute problem     Objective:   /96 (BP Location: Left arm, Patient Position: Sitting)   Pulse 76   Temp 36.3 °C (97.4 °F) (Temporal)   Resp 20   Ht 1.829 m (6')   Wt (!) 141.5 kg (312 lb)   SpO2 95%   BMI 42.31 kg/m²     Physical Exam   Constitutional: He is oriented to person, place, and time. He appears well-developed and well-nourished. No distress.   HENT:   Head: Normocephalic and atraumatic.   Eyes:   PERRLA, EOMI, increased uptake in pinpoint pattern in center of cornea on right eye with florescence stain, no eyelid swelling, no discharge    Neck: Normal range of motion. Neck supple.   Pulmonary/Chest: Effort normal.   Neurological: He is alert and oriented to person, place, and time.   Skin: Skin is warm and dry. No rash noted. He is not diaphoretic.   Psychiatric: He has a normal mood and affect. His behavior is normal. Judgment and thought content normal.     Assessment/Plan:   Assessment    1. Abrasion of right cornea, initial encounter  - polymixin-trimethoprim (POLYTRIM) 63394-9.1 UNIT/ML-% Solution; Place 1 Drop in right eye 4 times a day.  Dispense: 1 Bottle; Refill: 0    Patient was small, will provide eyedrops to prevent infection, reviewed expected course of recovery, F/U PRN.

## 2022-06-21 ENCOUNTER — OFFICE VISIT (OUTPATIENT)
Dept: URGENT CARE | Facility: CLINIC | Age: 42
End: 2022-06-21
Payer: COMMERCIAL

## 2022-06-21 VITALS
HEART RATE: 87 BPM | BODY MASS INDEX: 43.82 KG/M2 | TEMPERATURE: 97.6 F | OXYGEN SATURATION: 97 % | HEIGHT: 71 IN | DIASTOLIC BLOOD PRESSURE: 78 MMHG | SYSTOLIC BLOOD PRESSURE: 124 MMHG | WEIGHT: 313 LBS | RESPIRATION RATE: 17 BRPM

## 2022-06-21 DIAGNOSIS — R10.9 FLANK PAIN: ICD-10-CM

## 2022-06-21 DIAGNOSIS — R31.9 HEMATURIA, UNSPECIFIED TYPE: ICD-10-CM

## 2022-06-21 DIAGNOSIS — R10.9 ABDOMINAL PAIN, UNSPECIFIED ABDOMINAL LOCATION: ICD-10-CM

## 2022-06-21 LAB
APPEARANCE UR: CLEAR
BILIRUB UR STRIP-MCNC: NEGATIVE MG/DL
COLOR UR AUTO: YELLOW
GLUCOSE UR STRIP.AUTO-MCNC: NEGATIVE MG/DL
KETONES UR STRIP.AUTO-MCNC: NEGATIVE MG/DL
LEUKOCYTE ESTERASE UR QL STRIP.AUTO: NEGATIVE
NITRITE UR QL STRIP.AUTO: NEGATIVE
PH UR STRIP.AUTO: 7 [PH] (ref 5–8)
PROT UR QL STRIP: 30 MG/DL
RBC UR QL AUTO: NORMAL
SP GR UR STRIP.AUTO: 1.02
UROBILINOGEN UR STRIP-MCNC: 4 MG/DL

## 2022-06-21 PROCEDURE — 81002 URINALYSIS NONAUTO W/O SCOPE: CPT | Performed by: NURSE PRACTITIONER

## 2022-06-21 PROCEDURE — 99214 OFFICE O/P EST MOD 30 MIN: CPT | Performed by: NURSE PRACTITIONER

## 2022-06-21 RX ORDER — OXYCODONE HYDROCHLORIDE AND ACETAMINOPHEN 5; 325 MG/1; MG/1
1 TABLET ORAL EVERY 4 HOURS PRN
Qty: 15 TABLET | Refills: 0 | Status: SHIPPED | OUTPATIENT
Start: 2022-06-21 | End: 2022-06-24

## 2022-06-21 RX ORDER — KETOROLAC TROMETHAMINE 30 MG/ML
15 INJECTION, SOLUTION INTRAMUSCULAR; INTRAVENOUS ONCE
Status: COMPLETED | OUTPATIENT
Start: 2022-06-21 | End: 2022-06-21

## 2022-06-21 RX ADMIN — KETOROLAC TROMETHAMINE 15 MG: 30 INJECTION, SOLUTION INTRAMUSCULAR; INTRAVENOUS at 18:46

## 2022-06-22 ENCOUNTER — TELEPHONE (OUTPATIENT)
Dept: URGENT CARE | Facility: CLINIC | Age: 42
End: 2022-06-22
Payer: COMMERCIAL

## 2022-06-22 ASSESSMENT — ENCOUNTER SYMPTOMS
MYALGIAS: 0
SHORTNESS OF BREATH: 0
DIZZINESS: 0
FEVER: 0
CHILLS: 0
ABDOMINAL PAIN: 0
SORE THROAT: 0
EYE PAIN: 0
NAUSEA: 1
VOMITING: 0
PAIN: 1
FATIGUE: 0
FLANK PAIN: 1

## 2022-06-22 NOTE — PROGRESS NOTES
Subjective:   Ken Avilez is a 42 y.o. male who presents for Nephrolithiasis      Pain  This is a new problem. The current episode started yesterday (hx of kidney stones requiring surgical removal). The problem occurs constantly. The problem has been gradually worsening. Associated symptoms include nausea. Pertinent negatives include no abdominal pain, chest pain, chills, fatigue, fever, myalgias, rash, sore throat or vomiting. Associated symptoms comments: Left flank pain  . Nothing aggravates the symptoms. He has tried acetaminophen for the symptoms. The treatment provided no relief.       Review of Systems   Constitutional: Negative for chills, fatigue and fever.   HENT: Negative for sore throat.    Eyes: Negative for pain.   Respiratory: Negative for shortness of breath.    Cardiovascular: Negative for chest pain.   Gastrointestinal: Positive for nausea. Negative for abdominal pain and vomiting.   Genitourinary: Positive for flank pain. Negative for dysuria, frequency, hematuria and urgency.   Musculoskeletal: Negative for myalgias.   Skin: Negative for rash.   Neurological: Negative for dizziness.       Medications:    • ibuprofen Tabs  • oxyCODONE-acetaminophen Tabs  • polymixin-trimethoprim Soln  • SALONPAS EX    Allergies: Hydrocodone-acetaminophen    Problem List: Ken Avilez does not have any pertinent problems on file.    Surgical History:  Past Surgical History:   Procedure Laterality Date   • ORIF, ANKLE Right 6/2/2017    Procedure: ANKLE ORIF;  Surgeon: Mike Luna M.D.;  Location: [facility];  Service:    • CYSTOSCOPY STENT PLACEMENT Right 9/2/2015    Procedure: CYSTOSCOPY STENT PLACEMENT;  Surgeon: Arnulfo Mata M.D.;  Location: SURGERY [facility];  Service:    • URETEROSCOPY Right 9/2/2015    Procedure: URETEROSCOPY;  Surgeon: Arnulfo Mata M.D.;  Location: [facility];  Service:    • LASERTRIPSY Right 9/2/2015    Procedure: LASER LITHOTRIPSY;   "Surgeon: Arnulfo Mata M.D.;  Location: SURGERY [facility];  Service:        Past Social Hx: Ken Avilez  reports that he has been smoking cigarettes. He has a 21.00 pack-year smoking history. He has never used smokeless tobacco. He reports current alcohol use. He reports that he does not use drugs.     Past Family Hx:  Ken Avilez family history is not on file.     Problem list, medications, and allergies reviewed by myself today in Epic.     Objective:     /78 (BP Location: Right arm, Patient Position: Sitting, BP Cuff Size: Adult long)   Pulse 87   Temp 36.4 °C (97.6 °F) (Temporal)   Resp 17   Ht 1.803 m (5' 11\")   Wt (!) 142 kg (313 lb)   SpO2 97%   BMI 43.65 kg/m²     Physical Exam  Vitals and nursing note reviewed.   Constitutional:       General: He is not in acute distress.     Appearance: He is well-developed.   HENT:      Head: Normocephalic and atraumatic.      Right Ear: External ear normal.      Left Ear: External ear normal.      Nose: Nose normal.      Mouth/Throat:      Mouth: Mucous membranes are moist.   Eyes:      Conjunctiva/sclera: Conjunctivae normal.   Cardiovascular:      Rate and Rhythm: Normal rate.   Pulmonary:      Effort: Pulmonary effort is normal. No respiratory distress.      Breath sounds: Normal breath sounds.   Abdominal:      General: Bowel sounds are normal. There is no distension.      Tenderness: There is no abdominal tenderness. There is left CVA tenderness. There is no right CVA tenderness, guarding or rebound. Negative signs include Rodriguez's sign, Rovsing's sign, McBurney's sign, psoas sign and obturator sign.   Musculoskeletal:         General: Normal range of motion.   Skin:     General: Skin is warm and dry.   Neurological:      General: No focal deficit present.      Mental Status: He is alert and oriented to person, place, and time. Mental status is at baseline.      Gait: Gait (gait at baseline) normal.   Psychiatric:         Judgment: " Judgment normal.         Assessment/Plan:     Diagnosis and associated orders:     1. Flank pain  ketorolac (TORADOL) injection 15 mg    POCT Urinalysis    oxyCODONE-acetaminophen (PERCOCET) 5-325 MG Tab   2. Hematuria, unspecified type     3. Abdominal pain, unspecified abdominal location  CT-RENAL COLIC EVALUATION(A/P W/O)      Comments/MDM:     Results for orders placed or performed in visit on 06/21/22   POCT Urinalysis   Result Value Ref Range    POC Color yellow Negative    POC Appearance clear Negative    POC Leukocyte Esterase negative Negative    POC Nitrites negative Negative    POC Urobiligen 4.0 Negative (0.2) mg/dL    POC Protein 30 Negative mg/dL    POC Urine PH 7.0 5.0 - 8.0    POC Blood moderate Negative    POC Specific Gravity 1.025 <1.005 - >1.030    POC Ketones negative Negative mg/dL    POC Bilirubin negative Negative mg/dL    POC Glucose negative Negative mg/dL •       I personally reviewed prior external notes and prior test results pertinent to today's visit.   Patient has had a cystoscopy Scope with a stent placement in 2015.  Will obtain CT for further evaluation we will follow-up with pending results and treat as indicated.  Discussed management options, risks and benefits, and alternatives to treatment plan agreed upon.   Red flags discussed and indications to immediately call 911 or present to the Emergency Department.   Supportive care, differential diagnoses, and indications for immediate follow-up discussed with patient.    Patient expresses understanding and agrees to plan. Patient denies any other questions or concerns.      In prescribing controlled substances to this patient, I certify that I have obtained and reviewed the medical history of Ken Avilez. I have also made a good beto effort to obtain applicable records from other providers who have treated the patient and records did not demonstrate any increased risk of substance abuse that would prevent me from prescribing  controlled substances.     I have conducted a physical exam and documented it. I have reviewed Mr. Avilez’s prescription history as maintained by the [place] Prescription Monitoring Program.     I have assessed the patient’s risk for abuse, dependency, and addiction using the validated Opioid Risk Tool available at https://www.mdcalc.com/gocinp-zdnk-tqno-ort-narcotic-abuse.     Given the above, I believe the benefits of controlled substance therapy outweigh the risks. The reasons for prescribing controlled substances include non-narcotic, oral analgesic alternatives have been inadequate for pain control. Accordingly, I have discussed the risk and benefits, treatment plan, and alternative therapies with the patient.                Please note that this dictation was created using voice recognition software. I have made a reasonable attempt to correct obvious errors, but I expect that there are errors of grammar and possibly content that I did not discover before finalizing the note.    This note was electronically signed by Cheko PACK.

## 2022-06-22 NOTE — TELEPHONE ENCOUNTER
[facility] does not take [employer], I scheduled the pt for  [place] today 325pm, with [facility], however, they need usually 5-7 days to get an authorization from both insurances, they will work on it asap and call the pt if they need to reschedule due to no auth or they will have him sign a waiver. I left a VM for the pt informing him of his appt.

## 2023-02-11 ENCOUNTER — OFFICE VISIT (OUTPATIENT)
Dept: URGENT CARE | Facility: CLINIC | Age: 43
End: 2023-02-11
Payer: COMMERCIAL

## 2023-02-11 VITALS
SYSTOLIC BLOOD PRESSURE: 138 MMHG | WEIGHT: 295 LBS | OXYGEN SATURATION: 94 % | HEIGHT: 71 IN | BODY MASS INDEX: 41.3 KG/M2 | DIASTOLIC BLOOD PRESSURE: 88 MMHG | RESPIRATION RATE: 20 BRPM | TEMPERATURE: 98.5 F | HEART RATE: 74 BPM

## 2023-02-11 DIAGNOSIS — S05.02XA ABRASION OF LEFT CORNEA, INITIAL ENCOUNTER: ICD-10-CM

## 2023-02-11 PROCEDURE — 99213 OFFICE O/P EST LOW 20 MIN: CPT | Performed by: PHYSICIAN ASSISTANT

## 2023-02-11 RX ORDER — OFLOXACIN 3 MG/ML
1 SOLUTION/ DROPS OPHTHALMIC 4 TIMES DAILY
Qty: 10 ML | Refills: 0 | Status: SHIPPED | OUTPATIENT
Start: 2023-02-11 | End: 2023-02-18

## 2023-02-11 ASSESSMENT — ENCOUNTER SYMPTOMS
EYE DISCHARGE: 1
PHOTOPHOBIA: 1
BLURRED VISION: 0
DOUBLE VISION: 0
EYE PAIN: 0
EYE REDNESS: 1

## 2023-02-11 ASSESSMENT — VISUAL ACUITY: OU: 1

## 2023-02-11 NOTE — PROGRESS NOTES
Subjective:   Ken Avilez is a 42 y.o. male who presents today with   Chief Complaint   Patient presents with    Eye Injury     X 2 days, left eye injury, watering, red, swollen, sensitive to light     Eye Problem   The left eye is affected. This is a new problem. Episode onset: 2 days. The problem occurs constantly. The problem has been unchanged. The injury mechanism was contact lenses. Associated symptoms include an eye discharge, eye redness and photophobia. Pertinent negatives include no blurred vision or double vision.   Patient states he recently started wearing his contacts again and did leave them in overnight for a couple of nights.  No other recent injury or trauma.      PMH:  has a past medical history of Anesthesia, Asthma, Bronchitis, Heart burn, Hypertension, Indigestion, Renal disorder, Sleep apnea, and Snoring.  MEDS:   Current Outpatient Medications:     ofloxacin (OCUFLOX) 0.3 % Solution, Administer 1 Drop into the left eye 4 times a day for 7 days., Disp: 10 mL, Rfl: 0  ALLERGIES:   Allergies   Allergen Reactions    Hydrocodone-Acetaminophen Nausea     (Vicodin)  Cold sweats and I pass out.      SURGHX:   Past Surgical History:   Procedure Laterality Date    ORIF, ANKLE Right 6/2/2017    Procedure: ANKLE ORIF;  Surgeon: Mike Luna M.D.;  Location: [facility];  Service:     CYSTOSCOPY STENT PLACEMENT Right 9/2/2015    Procedure: CYSTOSCOPY STENT PLACEMENT;  Surgeon: Arnulfo Mata M.D.;  Location: [facility];  Service:     URETEROSCOPY Right 9/2/2015    Procedure: URETEROSCOPY;  Surgeon: Arnulfo Mata M.D.;  Location: SURGERY [facility];  Service:     LASERTRIPSY Right 9/2/2015    Procedure: LASER LITHOTRIPSY;  Surgeon: Arnulfo Mata M.D.;  Location: SURGERY [facility];  Service:      SOCHX:  reports that he has been smoking cigarettes. He has a 21.00 pack-year smoking history. He has never used smokeless tobacco. He reports current alcohol  "use. He reports that he does not use drugs.  FH: Reviewed with patient, not pertinent to this visit.     Review of Systems   Eyes:  Positive for photophobia, discharge and redness. Negative for blurred vision, double vision and pain.      Objective:   /88   Pulse 74   Temp 36.9 °C (98.5 °F) (Temporal)   Resp 20   Ht 1.803 m (5' 11\")   Wt (!) 134 kg (295 lb)   SpO2 94%   BMI 41.14 kg/m²   Physical Exam  Vitals and nursing note reviewed.   Constitutional:       General: He is not in acute distress.     Appearance: Normal appearance. He is well-developed. He is not ill-appearing or toxic-appearing.   HENT:      Head: Normocephalic and atraumatic.      Right Ear: Hearing normal.      Left Ear: Hearing normal.   Eyes:      General: Vision grossly intact.         Right eye: No foreign body or discharge.         Left eye: No foreign body or discharge.      Extraocular Movements: Extraocular movements intact.      Conjunctiva/sclera:      Left eye: Left conjunctiva is injected.      Pupils: Pupils are equal, round, and reactive to light.        Comments: Superficial pinpoint area of uptake noted to the left eye as marked above on Woods lamp exam with fluorescein stain.  No other uptake or foreign body appreciated at this time.   Cardiovascular:      Rate and Rhythm: Normal rate.   Pulmonary:      Effort: Pulmonary effort is normal.   Musculoskeletal:      Comments: Normal movement in all 4 extremities   Skin:     General: Skin is warm and dry.   Neurological:      Mental Status: He is alert.      Coordination: Coordination normal.   Psychiatric:         Mood and Affect: Mood normal.       Assessment/Plan:   Assessment    1. Abrasion of left cornea, initial encounter  - ofloxacin (OCUFLOX) 0.3 % Solution; Administer 1 Drop into the left eye 4 times a day for 7 days.  Dispense: 10 mL; Refill: 0  Symptoms and presentation are consistent with irritation/abrasion of the left cornea and we will treat accordingly with " antibiotic drops to the area.  Recommend he avoid wearing contacts and with any persistent or worsening symptoms I recommend he follow-up with ophthalmologist.    Differential diagnosis, natural history, supportive care, and indications for immediate follow-up discussed.   Patient given instructions and understanding of medications and treatment.    If not improving in 3-5 days, F/U with PCP or return to UC if symptoms worsen.    Patient agreeable to plan.      Please note that this dictation was created using voice recognition software. I have made every reasonable attempt to correct obvious errors, but I expect that there are errors of grammar and possibly content that I did not discover before finalizing the note.    Eamon Long PA-C

## 2023-09-11 ENCOUNTER — OFFICE VISIT (OUTPATIENT)
Dept: URGENT CARE | Facility: CLINIC | Age: 43
End: 2023-09-11
Payer: COMMERCIAL

## 2023-09-11 VITALS
WEIGHT: 315 LBS | HEIGHT: 71 IN | TEMPERATURE: 98.5 F | OXYGEN SATURATION: 95 % | HEART RATE: 87 BPM | DIASTOLIC BLOOD PRESSURE: 82 MMHG | BODY MASS INDEX: 44.1 KG/M2 | RESPIRATION RATE: 16 BRPM | SYSTOLIC BLOOD PRESSURE: 122 MMHG

## 2023-09-11 DIAGNOSIS — R06.00 DYSPNEA, UNSPECIFIED TYPE: ICD-10-CM

## 2023-09-11 DIAGNOSIS — R49.0 HOARSENESS OF VOICE: ICD-10-CM

## 2023-09-11 DIAGNOSIS — J01.40 ACUTE NON-RECURRENT PANSINUSITIS: Primary | ICD-10-CM

## 2023-09-11 DIAGNOSIS — J45.31 MILD PERSISTENT ASTHMA WITH (ACUTE) EXACERBATION: ICD-10-CM

## 2023-09-11 DIAGNOSIS — R05.8 PRODUCTIVE COUGH: ICD-10-CM

## 2023-09-11 PROCEDURE — 3079F DIAST BP 80-89 MM HG: CPT | Performed by: PHYSICIAN ASSISTANT

## 2023-09-11 PROCEDURE — 99214 OFFICE O/P EST MOD 30 MIN: CPT | Performed by: PHYSICIAN ASSISTANT

## 2023-09-11 PROCEDURE — 3074F SYST BP LT 130 MM HG: CPT | Performed by: PHYSICIAN ASSISTANT

## 2023-09-11 RX ORDER — ONDANSETRON 4 MG/1
4 TABLET, ORALLY DISINTEGRATING ORAL EVERY 6 HOURS PRN
COMMUNITY

## 2023-09-11 RX ORDER — MONTELUKAST SODIUM 10 MG/1
10 TABLET ORAL DAILY
COMMUNITY

## 2023-09-11 RX ORDER — PREDNISONE 20 MG/1
TABLET ORAL
Qty: 21 TABLET | Refills: 0 | Status: SHIPPED | OUTPATIENT
Start: 2023-09-11

## 2023-09-11 RX ORDER — ALBUTEROL SULFATE 90 UG/1
2 AEROSOL, METERED RESPIRATORY (INHALATION) EVERY 6 HOURS PRN
COMMUNITY

## 2023-09-11 RX ORDER — AMOXICILLIN AND CLAVULANATE POTASSIUM 875; 125 MG/1; MG/1
1 TABLET, FILM COATED ORAL 2 TIMES DAILY
Qty: 20 TABLET | Refills: 0 | Status: SHIPPED | OUTPATIENT
Start: 2023-09-11

## 2023-09-11 RX ORDER — METHOCARBAMOL 750 MG/1
750 TABLET, FILM COATED ORAL 4 TIMES DAILY
COMMUNITY

## 2023-09-11 RX ORDER — IBUPROFEN 800 MG/1
800 TABLET ORAL EVERY 8 HOURS PRN
COMMUNITY

## 2023-09-11 RX ORDER — PANTOPRAZOLE SODIUM 40 MG/1
40 TABLET, DELAYED RELEASE ORAL DAILY
COMMUNITY

## 2023-09-11 RX ORDER — AMLODIPINE BESYLATE 10 MG/1
10 TABLET ORAL DAILY
COMMUNITY

## 2023-09-11 RX ORDER — ATORVASTATIN CALCIUM 10 MG/1
10 TABLET, FILM COATED ORAL NIGHTLY
COMMUNITY

## 2023-09-11 RX ORDER — ERGOCALCIFEROL 1.25 MG/1
50000 CAPSULE ORAL
COMMUNITY

## 2023-09-11 RX ORDER — SUCRALFATE 1 G/1
1 TABLET ORAL
COMMUNITY

## 2023-09-11 RX ORDER — LORATADINE 10 MG/1
10 TABLET ORAL DAILY
COMMUNITY

## 2023-09-11 RX ORDER — TESTOSTERONE CYPIONATE 200 MG/ML
50 INJECTION, SOLUTION INTRAMUSCULAR ONCE
COMMUNITY

## 2023-09-11 RX ORDER — LOSARTAN POTASSIUM 25 MG/1
25 TABLET ORAL DAILY
COMMUNITY
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
## 2023-09-16 ASSESSMENT — ENCOUNTER SYMPTOMS
SPUTUM PRODUCTION: 0
FEVER: 0
COUGH: 1
SINUS PAIN: 1
WHEEZING: 1
SHORTNESS OF BREATH: 1
STRIDOR: 0

## 2024-01-08 ENCOUNTER — OFFICE VISIT (OUTPATIENT)
Dept: URGENT CARE | Facility: PHYSICIAN GROUP | Age: 44
End: 2024-01-08
Payer: COMMERCIAL

## 2024-01-08 VITALS
OXYGEN SATURATION: 96 % | HEIGHT: 71 IN | BODY MASS INDEX: 44.1 KG/M2 | DIASTOLIC BLOOD PRESSURE: 76 MMHG | WEIGHT: 315 LBS | SYSTOLIC BLOOD PRESSURE: 128 MMHG | RESPIRATION RATE: 18 BRPM | HEART RATE: 79 BPM | TEMPERATURE: 98.8 F

## 2024-01-08 DIAGNOSIS — R68.89 FLU-LIKE SYMPTOMS: ICD-10-CM

## 2024-01-08 LAB
FLUAV RNA SPEC QL NAA+PROBE: NEGATIVE
FLUBV RNA SPEC QL NAA+PROBE: NEGATIVE
RSV RNA SPEC QL NAA+PROBE: NEGATIVE
SARS-COV-2 RNA RESP QL NAA+PROBE: NEGATIVE

## 2024-01-08 PROCEDURE — 0241U POCT CEPHEID COV-2, FLU A/B, RSV - PCR: CPT | Performed by: REGISTERED NURSE

## 2024-01-08 PROCEDURE — 3078F DIAST BP <80 MM HG: CPT | Performed by: REGISTERED NURSE

## 2024-01-08 PROCEDURE — 99213 OFFICE O/P EST LOW 20 MIN: CPT | Performed by: REGISTERED NURSE

## 2024-01-08 PROCEDURE — 3074F SYST BP LT 130 MM HG: CPT | Performed by: REGISTERED NURSE

## 2024-01-08 RX ORDER — ALBUTEROL SULFATE 90 UG/1
2 AEROSOL, METERED RESPIRATORY (INHALATION) EVERY 6 HOURS PRN
Qty: 8.5 G | Refills: 0 | Status: SHIPPED | OUTPATIENT
Start: 2024-01-08

## 2024-01-08 ASSESSMENT — ENCOUNTER SYMPTOMS
COUGH: 1
SHORTNESS OF BREATH: 0
FEVER: 1
DIZZINESS: 0
CHILLS: 1
SORE THROAT: 1

## 2024-01-09 NOTE — PROGRESS NOTES
"Subjective:   Ken Avilez is a 43 y.o. male who presents for Flu Like Symptoms      HPI  Flu like symptoms 1-2 days. Fever tmax 101. Dayquil/nyquil. Here with family who have similar symptoms. Hx of bronchitis, and ROSELIA. Does smoke tobacco. No recent hospitalizations. Immunizations current.    Review of Systems   Constitutional:  Positive for chills and fever.   HENT:  Positive for congestion and sore throat.    Respiratory:  Positive for cough. Negative for shortness of breath.    Cardiovascular:  Negative for chest pain.   Skin:  Negative for rash.   Neurological:  Negative for dizziness.       Medications, Allergies, and current problem list reviewed today in Epic.     Objective:     /76   Pulse 79   Temp 37.1 °C (98.8 °F) (Temporal)   Resp 18   Ht 1.803 m (5' 11\")   Wt (!) 147 kg (325 lb)   SpO2 96%     Physical Exam  Vitals and nursing note reviewed.   Constitutional:       General: He is not in acute distress.     Appearance: Normal appearance. He is well-developed. He is not ill-appearing, toxic-appearing or diaphoretic.   HENT:      Head: Normocephalic and atraumatic.      Right Ear: Tympanic membrane, ear canal and external ear normal.      Left Ear: Tympanic membrane, ear canal and external ear normal.      Nose: Congestion present. No rhinorrhea.      Mouth/Throat:      Mouth: Mucous membranes are moist.      Pharynx: Oropharynx is clear. No oropharyngeal exudate or posterior oropharyngeal erythema.      Comments: PND  Eyes:      Conjunctiva/sclera: Conjunctivae normal.   Cardiovascular:      Rate and Rhythm: Normal rate and regular rhythm.      Pulses: Normal pulses.      Heart sounds: Normal heart sounds.   Pulmonary:      Effort: Pulmonary effort is normal. No respiratory distress.      Breath sounds: Normal breath sounds. No wheezing, rhonchi or rales.      Comments: Harsh cough  Chest:      Chest wall: No tenderness.   Musculoskeletal:         General: No swelling or tenderness.     "  Cervical back: Normal range of motion and neck supple.      Right lower leg: No edema.      Left lower leg: No edema.   Lymphadenopathy:      Cervical: No cervical adenopathy.   Skin:     General: Skin is warm and dry.   Neurological:      General: No focal deficit present.      Mental Status: He is alert and oriented to person, place, and time. Mental status is at baseline.   Psychiatric:         Mood and Affect: Mood normal.         Lab Results/POC Test Results   Results for orders placed or performed in visit on 01/08/24   POCT CoV-2, Flu A/B, RSV by PCR   Result Value Ref Range    SARS-CoV-2 by PCR Negative Negative, Invalid    Influenza virus A RNA Negative Negative, Invalid    Influenza virus B, PCR Negative Negative, Invalid    RSV, PCR Negative Negative, Invalid           Assessment/Plan:     Differential diagnosis discussed     1. Flu-like symptoms  POCT CoV-2, Flu A/B, RSV by PCR    albuterol 108 (90 Base) MCG/ACT Aero Soln inhalation aerosol        Presenting with family who has similar symptoms.  1 did test positive for RSV.  He does have a history of ROSELIA but denies COPD, does smoke.  Vitals are reassuring.  Exam is benign without increased work of breathing or adventitious lung sounds.  Offered reassurance.  Will send albuterol inhaler given history of smoking.  Recommend adequate hydration, rest, deep breathing and coughing, ambulation as tolerated, OTC medications.    Return to clinic or go to ED if symptoms worsen or persist. Indications for ED discussed at length. Red flag symptoms discussed. All side effects of medication discussed including allergic response, GI upset, tendon injury, rash, sedation etc. Patient and/or guardian voices understanding.     I personally reviewed prior external notes and test results pertinent to today's visit as well as additional imaging and testing completed in clinic today.     Please note that this dictation was created using voice recognition software. I have  made every reasonable attempt to correct obvious errors, but I expect that there are errors of grammar and possibly content that I did not discover before finalizing the note.    This note was electronically signed by FEROZ Maharaj

## 2024-09-04 ENCOUNTER — HOSPITAL ENCOUNTER (OUTPATIENT)
Dept: LAB | Facility: MEDICAL CENTER | Age: 44
End: 2024-09-04
Attending: NURSE PRACTITIONER
Payer: COMMERCIAL

## 2024-09-04 LAB
25(OH)D3 SERPL-MCNC: 43 NG/ML (ref 30–100)
ALBUMIN SERPL BCP-MCNC: 4.2 G/DL (ref 3.2–4.9)
ALBUMIN/GLOB SERPL: 1.4 G/DL
ALP SERPL-CCNC: 110 U/L (ref 30–99)
ALT SERPL-CCNC: 31 U/L (ref 2–50)
ANION GAP SERPL CALC-SCNC: 16 MMOL/L (ref 7–16)
AST SERPL-CCNC: 22 U/L (ref 12–45)
BASOPHILS # BLD AUTO: 0.8 % (ref 0–1.8)
BASOPHILS # BLD: 0.1 K/UL (ref 0–0.12)
BILIRUB SERPL-MCNC: 0.5 MG/DL (ref 0.1–1.5)
BUN SERPL-MCNC: 24 MG/DL (ref 8–22)
CALCIUM ALBUM COR SERPL-MCNC: 9.2 MG/DL (ref 8.5–10.5)
CALCIUM SERPL-MCNC: 9.4 MG/DL (ref 8.5–10.5)
CHLORIDE SERPL-SCNC: 97 MMOL/L (ref 96–112)
CHOLEST SERPL-MCNC: 153 MG/DL (ref 100–199)
CO2 SERPL-SCNC: 23 MMOL/L (ref 20–33)
CREAT SERPL-MCNC: 1.53 MG/DL (ref 0.5–1.4)
EOSINOPHIL # BLD AUTO: 0.27 K/UL (ref 0–0.51)
EOSINOPHIL NFR BLD: 2.2 % (ref 0–6.9)
ERYTHROCYTE [DISTWIDTH] IN BLOOD BY AUTOMATED COUNT: 43.1 FL (ref 35.9–50)
EST. AVERAGE GLUCOSE BLD GHB EST-MCNC: 105 MG/DL
FOLATE SERPL-MCNC: 2.4 NG/ML
GFR SERPLBLD CREATININE-BSD FMLA CKD-EPI: 57 ML/MIN/1.73 M 2
GLOBULIN SER CALC-MCNC: 3.1 G/DL (ref 1.9–3.5)
GLUCOSE SERPL-MCNC: 88 MG/DL (ref 65–99)
HBA1C MFR BLD: 5.3 % (ref 4–5.6)
HCT VFR BLD AUTO: 46.4 % (ref 42–52)
HDLC SERPL-MCNC: 21 MG/DL
HGB BLD-MCNC: 16.8 G/DL (ref 14–18)
IMM GRANULOCYTES # BLD AUTO: 0.05 K/UL (ref 0–0.11)
IMM GRANULOCYTES NFR BLD AUTO: 0.4 % (ref 0–0.9)
LDLC SERPL CALC-MCNC: 84 MG/DL
LYMPHOCYTES # BLD AUTO: 3.71 K/UL (ref 1–4.8)
LYMPHOCYTES NFR BLD: 30.3 % (ref 22–41)
MAGNESIUM SERPL-MCNC: 2 MG/DL (ref 1.5–2.5)
MCH RBC QN AUTO: 33.1 PG (ref 27–33)
MCHC RBC AUTO-ENTMCNC: 36.2 G/DL (ref 32.3–36.5)
MCV RBC AUTO: 91.5 FL (ref 81.4–97.8)
MONOCYTES # BLD AUTO: 0.9 K/UL (ref 0–0.85)
MONOCYTES NFR BLD AUTO: 7.3 % (ref 0–13.4)
NEUTROPHILS # BLD AUTO: 7.22 K/UL (ref 1.82–7.42)
NEUTROPHILS NFR BLD: 59 % (ref 44–72)
NRBC # BLD AUTO: 0 K/UL
NRBC BLD-RTO: 0 /100 WBC (ref 0–0.2)
PLATELET # BLD AUTO: 253 K/UL (ref 164–446)
PMV BLD AUTO: 11.9 FL (ref 9–12.9)
POTASSIUM SERPL-SCNC: 3 MMOL/L (ref 3.6–5.5)
PROT SERPL-MCNC: 7.3 G/DL (ref 6–8.2)
RBC # BLD AUTO: 5.07 M/UL (ref 4.7–6.1)
SODIUM SERPL-SCNC: 136 MMOL/L (ref 135–145)
T3FREE SERPL-MCNC: 2.9 PG/ML (ref 2–4.4)
T4 FREE SERPL-MCNC: 1.13 NG/DL (ref 0.93–1.7)
THYROPEROXIDASE AB SERPL-ACNC: <9 IU/ML (ref 0–9)
TRIGL SERPL-MCNC: 241 MG/DL (ref 0–149)
TSH SERPL-ACNC: 1 UIU/ML (ref 0.35–5.5)
VIT B12 SERPL-MCNC: 759 PG/ML (ref 211–911)
WBC # BLD AUTO: 12.3 K/UL (ref 4.8–10.8)

## 2024-09-04 PROCEDURE — 86376 MICROSOMAL ANTIBODY EACH: CPT

## 2024-09-04 PROCEDURE — 85025 COMPLETE CBC W/AUTO DIFF WBC: CPT

## 2024-09-04 PROCEDURE — 84270 ASSAY OF SEX HORMONE GLOBUL: CPT

## 2024-09-04 PROCEDURE — 83036 HEMOGLOBIN GLYCOSYLATED A1C: CPT

## 2024-09-04 PROCEDURE — 82746 ASSAY OF FOLIC ACID SERUM: CPT

## 2024-09-04 PROCEDURE — 86800 THYROGLOBULIN ANTIBODY: CPT

## 2024-09-04 PROCEDURE — 83735 ASSAY OF MAGNESIUM: CPT

## 2024-09-04 PROCEDURE — 84439 ASSAY OF FREE THYROXINE: CPT

## 2024-09-04 PROCEDURE — 36415 COLL VENOUS BLD VENIPUNCTURE: CPT

## 2024-09-04 PROCEDURE — 84481 FREE ASSAY (FT-3): CPT

## 2024-09-04 PROCEDURE — 84403 ASSAY OF TOTAL TESTOSTERONE: CPT

## 2024-09-04 PROCEDURE — 84402 ASSAY OF FREE TESTOSTERONE: CPT

## 2024-09-04 PROCEDURE — 82306 VITAMIN D 25 HYDROXY: CPT

## 2024-09-04 PROCEDURE — 80053 COMPREHEN METABOLIC PANEL: CPT

## 2024-09-04 PROCEDURE — 82607 VITAMIN B-12: CPT

## 2024-09-04 PROCEDURE — 80061 LIPID PANEL: CPT

## 2024-09-04 PROCEDURE — 84443 ASSAY THYROID STIM HORMONE: CPT

## 2024-09-06 LAB
SHBG SERPL-SCNC: 29 NMOL/L (ref 17–56)
TESTOST FREE MFR SERPL: 1.9 % (ref 1.6–2.9)
TESTOST FREE SERPL-MCNC: 46 PG/ML (ref 47–244)
TESTOST SERPL-MCNC: 242 NG/DL (ref 300–890)
THYROGLOB AB SERPL-ACNC: <0.9 IU/ML (ref 0–4)

## 2024-11-01 ENCOUNTER — APPOINTMENT (OUTPATIENT)
Dept: LAB | Facility: MEDICAL CENTER | Age: 44
End: 2024-11-01
Payer: COMMERCIAL

## 2025-01-11 ENCOUNTER — HOSPITAL ENCOUNTER (OUTPATIENT)
Dept: LAB | Facility: MEDICAL CENTER | Age: 45
End: 2025-01-11
Attending: NURSE PRACTITIONER
Payer: COMMERCIAL

## 2025-01-11 LAB
ALBUMIN SERPL BCP-MCNC: 4.3 G/DL (ref 3.2–4.9)
ALBUMIN/GLOB SERPL: 1.5 G/DL
ALP SERPL-CCNC: 97 U/L (ref 30–99)
ALT SERPL-CCNC: 20 U/L (ref 2–50)
ANION GAP SERPL CALC-SCNC: 16 MMOL/L (ref 7–16)
AST SERPL-CCNC: 18 U/L (ref 12–45)
BILIRUB SERPL-MCNC: 0.4 MG/DL (ref 0.1–1.5)
BUN SERPL-MCNC: 14 MG/DL (ref 8–22)
CALCIUM ALBUM COR SERPL-MCNC: 9 MG/DL (ref 8.5–10.5)
CALCIUM SERPL-MCNC: 9.2 MG/DL (ref 8.5–10.5)
CHLORIDE SERPL-SCNC: 102 MMOL/L (ref 96–112)
CO2 SERPL-SCNC: 22 MMOL/L (ref 20–33)
CREAT SERPL-MCNC: 1.39 MG/DL (ref 0.5–1.4)
GFR SERPLBLD CREATININE-BSD FMLA CKD-EPI: 64 ML/MIN/1.73 M 2
GLOBULIN SER CALC-MCNC: 2.9 G/DL (ref 1.9–3.5)
GLUCOSE SERPL-MCNC: 125 MG/DL (ref 65–99)
POTASSIUM SERPL-SCNC: 3.5 MMOL/L (ref 3.6–5.5)
PROT SERPL-MCNC: 7.2 G/DL (ref 6–8.2)
SODIUM SERPL-SCNC: 140 MMOL/L (ref 135–145)

## 2025-01-11 PROCEDURE — 82671 ASSAY OF ESTROGENS: CPT

## 2025-01-11 PROCEDURE — 36415 COLL VENOUS BLD VENIPUNCTURE: CPT

## 2025-01-11 PROCEDURE — 80053 COMPREHEN METABOLIC PANEL: CPT

## 2025-01-15 LAB
ESTRADIOL SERPL HS-MCNC: 62.3 PG/ML (ref 10–42)
ESTROGEN SERPL CALC-MCNC: 106.8 PG/ML (ref 19–69)
ESTRONE SERPL-MCNC: 44.5 PG/ML (ref 9–36)

## 2025-02-26 ENCOUNTER — APPOINTMENT (OUTPATIENT)
Dept: RADIOLOGY | Facility: MEDICAL CENTER | Age: 45
End: 2025-02-26
Attending: STUDENT IN AN ORGANIZED HEALTH CARE EDUCATION/TRAINING PROGRAM
Payer: COMMERCIAL

## 2025-02-26 ENCOUNTER — HOSPITAL ENCOUNTER (OUTPATIENT)
Facility: MEDICAL CENTER | Age: 45
End: 2025-02-27
Attending: STUDENT IN AN ORGANIZED HEALTH CARE EDUCATION/TRAINING PROGRAM | Admitting: HOSPITALIST
Payer: COMMERCIAL
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
## 2025-02-26 DIAGNOSIS — S52.502A CLOSED FRACTURE OF DISTAL END OF LEFT RADIUS, UNSPECIFIED FRACTURE MORPHOLOGY, INITIAL ENCOUNTER: ICD-10-CM

## 2025-02-26 DIAGNOSIS — S52.352A CLOSED DISPLACED COMMINUTED FRACTURE OF SHAFT OF LEFT RADIUS, INITIAL ENCOUNTER: ICD-10-CM

## 2025-02-26 DIAGNOSIS — T07.XXXA ABRASIONS OF MULTIPLE SITES: ICD-10-CM

## 2025-02-26 DIAGNOSIS — G47.33 OSA (OBSTRUCTIVE SLEEP APNEA): ICD-10-CM

## 2025-02-26 DIAGNOSIS — N17.9 AKI (ACUTE KIDNEY INJURY) (HCC): ICD-10-CM

## 2025-02-26 LAB
ALBUMIN SERPL BCP-MCNC: 4.2 G/DL (ref 3.2–4.9)
ALBUMIN/GLOB SERPL: 1.4 G/DL
ALP SERPL-CCNC: 87 U/L (ref 30–99)
ALT SERPL-CCNC: 34 U/L (ref 2–50)
ANION GAP SERPL CALC-SCNC: 13 MMOL/L (ref 7–16)
APTT PPP: 27.1 SEC (ref 24.7–36)
AST SERPL-CCNC: 37 U/L (ref 12–45)
BILIRUB SERPL-MCNC: 0.9 MG/DL (ref 0.1–1.5)
BUN SERPL-MCNC: 21 MG/DL (ref 8–22)
CALCIUM ALBUM COR SERPL-MCNC: 8.9 MG/DL (ref 8.5–10.5)
CALCIUM SERPL-MCNC: 9.1 MG/DL (ref 8.5–10.5)
CHLORIDE SERPL-SCNC: 101 MMOL/L (ref 96–112)
CO2 SERPL-SCNC: 21 MMOL/L (ref 20–33)
CREAT SERPL-MCNC: 1.59 MG/DL (ref 0.5–1.4)
ERYTHROCYTE [DISTWIDTH] IN BLOOD BY AUTOMATED COUNT: 47.2 FL (ref 35.9–50)
ETHANOL BLD-MCNC: <10.1 MG/DL
GFR SERPLBLD CREATININE-BSD FMLA CKD-EPI: 54 ML/MIN/1.73 M 2
GLOBULIN SER CALC-MCNC: 3.1 G/DL (ref 1.9–3.5)
GLUCOSE SERPL-MCNC: 105 MG/DL (ref 65–99)
HCT VFR BLD AUTO: 60.2 % (ref 42–52)
HGB BLD-MCNC: 21.8 G/DL (ref 14–18)
HOLDING TUBE BB 8507: NORMAL
INR PPP: 1.08 (ref 0.87–1.13)
MCH RBC QN AUTO: 33.2 PG (ref 27–33)
MCHC RBC AUTO-ENTMCNC: 36.2 G/DL (ref 32.3–36.5)
MCV RBC AUTO: 91.6 FL (ref 81.4–97.8)
PLATELET # BLD AUTO: 227 K/UL (ref 164–446)
PMV BLD AUTO: 11.9 FL (ref 9–12.9)
POTASSIUM SERPL-SCNC: 3.3 MMOL/L (ref 3.6–5.5)
PROT SERPL-MCNC: 7.3 G/DL (ref 6–8.2)
PROTHROMBIN TIME: 14.1 SEC (ref 12–14.6)
RBC # BLD AUTO: 6.57 M/UL (ref 4.7–6.1)
SODIUM SERPL-SCNC: 135 MMOL/L (ref 135–145)
WBC # BLD AUTO: 13.5 K/UL (ref 4.8–10.8)

## 2025-02-26 PROCEDURE — 96376 TX/PRO/DX INJ SAME DRUG ADON: CPT

## 2025-02-26 PROCEDURE — 99152 MOD SED SAME PHYS/QHP 5/>YRS: CPT

## 2025-02-26 PROCEDURE — 99285 EMERGENCY DEPT VISIT HI MDM: CPT

## 2025-02-26 PROCEDURE — 82077 ASSAY SPEC XCP UR&BREATH IA: CPT

## 2025-02-26 PROCEDURE — 36415 COLL VENOUS BLD VENIPUNCTURE: CPT

## 2025-02-26 PROCEDURE — 700101 HCHG RX REV CODE 250: Performed by: STUDENT IN AN ORGANIZED HEALTH CARE EDUCATION/TRAINING PROGRAM

## 2025-02-26 PROCEDURE — 85610 PROTHROMBIN TIME: CPT

## 2025-02-26 PROCEDURE — 96374 THER/PROPH/DIAG INJ IV PUSH: CPT

## 2025-02-26 PROCEDURE — 73090 X-RAY EXAM OF FOREARM: CPT | Mod: RT

## 2025-02-26 PROCEDURE — 94799 UNLISTED PULMONARY SVC/PX: CPT

## 2025-02-26 PROCEDURE — 29125 APPL SHORT ARM SPLINT STATIC: CPT

## 2025-02-26 PROCEDURE — 85730 THROMBOPLASTIN TIME PARTIAL: CPT

## 2025-02-26 PROCEDURE — 73100 X-RAY EXAM OF WRIST: CPT | Mod: LT

## 2025-02-26 PROCEDURE — 80053 COMPREHEN METABOLIC PANEL: CPT

## 2025-02-26 PROCEDURE — 90471 IMMUNIZATION ADMIN: CPT

## 2025-02-26 PROCEDURE — 85027 COMPLETE CBC AUTOMATED: CPT

## 2025-02-26 PROCEDURE — 302875 HCHG BANDAGE ACE 4 OR 6""

## 2025-02-26 PROCEDURE — 73200 CT UPPER EXTREMITY W/O DYE: CPT | Mod: RT

## 2025-02-26 PROCEDURE — 90715 TDAP VACCINE 7 YRS/> IM: CPT | Performed by: STUDENT IN AN ORGANIZED HEALTH CARE EDUCATION/TRAINING PROGRAM

## 2025-02-26 PROCEDURE — 73100 X-RAY EXAM OF WRIST: CPT | Mod: RT

## 2025-02-26 PROCEDURE — 770001 HCHG ROOM/CARE - MED/SURG/GYN PRIV*

## 2025-02-26 PROCEDURE — 71045 X-RAY EXAM CHEST 1 VIEW: CPT

## 2025-02-26 PROCEDURE — 700111 HCHG RX REV CODE 636 W/ 250 OVERRIDE (IP): Mod: JZ | Performed by: STUDENT IN AN ORGANIZED HEALTH CARE EDUCATION/TRAINING PROGRAM

## 2025-02-26 PROCEDURE — 96375 TX/PRO/DX INJ NEW DRUG ADDON: CPT

## 2025-02-26 PROCEDURE — 25605 CLTX DST RDL FX/EPHYS SEP W/: CPT

## 2025-02-26 PROCEDURE — 307740 HCHG GREEN TRAUMA TEAM SERVICES

## 2025-02-26 RX ORDER — FOLIC ACID 1 MG/1
1 TABLET ORAL DAILY
COMMUNITY

## 2025-02-26 RX ORDER — POTASSIUM CITRATE 1080 MG/1
10 TABLET, EXTENDED RELEASE ORAL DAILY
COMMUNITY

## 2025-02-26 RX ORDER — KETOROLAC TROMETHAMINE 15 MG/ML
15 INJECTION, SOLUTION INTRAMUSCULAR; INTRAVENOUS ONCE
Status: COMPLETED | OUTPATIENT
Start: 2025-02-26 | End: 2025-02-26

## 2025-02-26 RX ORDER — SODIUM CHLORIDE, SODIUM LACTATE, POTASSIUM CHLORIDE, CALCIUM CHLORIDE 600; 310; 30; 20 MG/100ML; MG/100ML; MG/100ML; MG/100ML
INJECTION, SOLUTION INTRAVENOUS CONTINUOUS
Status: DISCONTINUED | OUTPATIENT
Start: 2025-02-26 | End: 2025-02-27

## 2025-02-26 RX ORDER — PROCHLORPERAZINE EDISYLATE 5 MG/ML
5-10 INJECTION INTRAMUSCULAR; INTRAVENOUS EVERY 4 HOURS PRN
Status: DISCONTINUED | OUTPATIENT
Start: 2025-02-26 | End: 2025-02-27 | Stop reason: HOSPADM

## 2025-02-26 RX ORDER — CHLORTHALIDONE 25 MG/1
25 TABLET ORAL DAILY
COMMUNITY

## 2025-02-26 RX ORDER — OXYCODONE HYDROCHLORIDE 10 MG/1
10 TABLET ORAL
Refills: 0 | Status: DISCONTINUED | OUTPATIENT
Start: 2025-02-26 | End: 2025-02-27 | Stop reason: HOSPADM

## 2025-02-26 RX ORDER — ACETAMINOPHEN 325 MG/1
650 TABLET ORAL EVERY 6 HOURS PRN
Status: DISCONTINUED | OUTPATIENT
Start: 2025-02-26 | End: 2025-02-27

## 2025-02-26 RX ORDER — HYDROMORPHONE HYDROCHLORIDE 1 MG/ML
0.5 INJECTION, SOLUTION INTRAMUSCULAR; INTRAVENOUS; SUBCUTANEOUS
Status: DISCONTINUED | OUTPATIENT
Start: 2025-02-26 | End: 2025-02-27 | Stop reason: HOSPADM

## 2025-02-26 RX ORDER — CETIRIZINE HYDROCHLORIDE 10 MG/1
10 TABLET ORAL
COMMUNITY

## 2025-02-26 RX ORDER — VALSARTAN AND HYDROCHLOROTHIAZIDE 320; 25 MG/1; MG/1
1 TABLET, FILM COATED ORAL DAILY
COMMUNITY

## 2025-02-26 RX ORDER — LABETALOL HYDROCHLORIDE 5 MG/ML
10 INJECTION, SOLUTION INTRAVENOUS EVERY 4 HOURS PRN
Status: DISCONTINUED | OUTPATIENT
Start: 2025-02-26 | End: 2025-02-27 | Stop reason: HOSPADM

## 2025-02-26 RX ORDER — OXYCODONE HYDROCHLORIDE 5 MG/1
5 TABLET ORAL
Refills: 0 | Status: DISCONTINUED | OUTPATIENT
Start: 2025-02-26 | End: 2025-02-27 | Stop reason: HOSPADM

## 2025-02-26 RX ORDER — ONDANSETRON 4 MG/1
4 TABLET, ORALLY DISINTEGRATING ORAL EVERY 4 HOURS PRN
Status: DISCONTINUED | OUTPATIENT
Start: 2025-02-26 | End: 2025-02-27 | Stop reason: HOSPADM

## 2025-02-26 RX ORDER — ONDANSETRON 2 MG/ML
4 INJECTION INTRAMUSCULAR; INTRAVENOUS EVERY 4 HOURS PRN
Status: DISCONTINUED | OUTPATIENT
Start: 2025-02-26 | End: 2025-02-27 | Stop reason: HOSPADM

## 2025-02-26 RX ORDER — MORPHINE SULFATE 4 MG/ML
INJECTION INTRAVENOUS
Status: COMPLETED | OUTPATIENT
Start: 2025-02-26 | End: 2025-02-26

## 2025-02-26 RX ORDER — PROMETHAZINE HYDROCHLORIDE 25 MG/1
12.5-25 SUPPOSITORY RECTAL EVERY 4 HOURS PRN
Status: DISCONTINUED | OUTPATIENT
Start: 2025-02-26 | End: 2025-02-27 | Stop reason: HOSPADM

## 2025-02-26 RX ORDER — PROMETHAZINE HYDROCHLORIDE 25 MG/1
12.5-25 TABLET ORAL EVERY 4 HOURS PRN
Status: DISCONTINUED | OUTPATIENT
Start: 2025-02-26 | End: 2025-02-27 | Stop reason: HOSPADM

## 2025-02-26 RX ORDER — ONDANSETRON 2 MG/ML
4 INJECTION INTRAMUSCULAR; INTRAVENOUS ONCE
Status: COMPLETED | OUTPATIENT
Start: 2025-02-26 | End: 2025-02-26

## 2025-02-26 RX ORDER — VARENICLINE TARTRATE 1 MG/1
1 TABLET, FILM COATED ORAL 2 TIMES DAILY
COMMUNITY

## 2025-02-26 RX ADMIN — CLOSTRIDIUM TETANI TOXOID ANTIGEN (FORMALDEHYDE INACTIVATED), CORYNEBACTERIUM DIPHTHERIAE TOXOID ANTIGEN (FORMALDEHYDE INACTIVATED), BORDETELLA PERTUSSIS TOXOID ANTIGEN (GLUTARALDEHYDE INACTIVATED), BORDETELLA PERTUSSIS FILAMENTOUS HEMAGGLUTININ ANTIGEN (FORMALDEHYDE INACTIVATED), BORDETELLA PERTUSSIS PERTACTIN ANTIGEN, AND BORDETELLA PERTUSSIS FIMBRIAE 2/3 ANTIGEN 0.5 ML: 5; 2; 2.5; 5; 3; 5 INJECTION, SUSPENSION INTRAMUSCULAR at 19:37

## 2025-02-26 RX ADMIN — KETAMINE HYDROCHLORIDE 100 MG: 50 INJECTION INTRAMUSCULAR; INTRAVENOUS at 21:26

## 2025-02-26 RX ADMIN — ONDANSETRON 4 MG: 2 INJECTION INTRAMUSCULAR; INTRAVENOUS at 20:05

## 2025-02-26 RX ADMIN — KETAMINE HYDROCHLORIDE 50 MG: 50 INJECTION INTRAMUSCULAR; INTRAVENOUS at 21:29

## 2025-02-26 RX ADMIN — KETOROLAC TROMETHAMINE 15 MG: 15 INJECTION, SOLUTION INTRAMUSCULAR; INTRAVENOUS at 19:59

## 2025-02-26 RX ADMIN — MORPHINE SULFATE 6 MG: 10 INJECTION INTRAVENOUS at 19:59

## 2025-02-26 RX ADMIN — MORPHINE SULFATE 4 MG: 4 INJECTION, SOLUTION INTRAMUSCULAR; INTRAVENOUS at 19:08

## 2025-02-26 ASSESSMENT — PAIN DESCRIPTION - PAIN TYPE
TYPE: ACUTE PAIN
TYPE: ACUTE PAIN

## 2025-02-26 ASSESSMENT — FIBROSIS 4 INDEX: FIB4 SCORE: 1.23

## 2025-02-27 ENCOUNTER — ANESTHESIA (OUTPATIENT)
Dept: SURGERY | Facility: MEDICAL CENTER | Age: 45
End: 2025-02-27
Payer: COMMERCIAL

## 2025-02-27 ENCOUNTER — PHARMACY VISIT (OUTPATIENT)
Dept: PHARMACY | Facility: MEDICAL CENTER | Age: 45
End: 2025-02-27
Payer: COMMERCIAL

## 2025-02-27 ENCOUNTER — APPOINTMENT (OUTPATIENT)
Dept: RADIOLOGY | Facility: MEDICAL CENTER | Age: 45
End: 2025-02-27
Attending: ORTHOPAEDIC SURGERY
Payer: COMMERCIAL

## 2025-02-27 ENCOUNTER — ANESTHESIA EVENT (OUTPATIENT)
Dept: SURGERY | Facility: MEDICAL CENTER | Age: 45
End: 2025-02-27
Payer: COMMERCIAL

## 2025-02-27 VITALS
HEART RATE: 98 BPM | DIASTOLIC BLOOD PRESSURE: 75 MMHG | BODY MASS INDEX: 44.1 KG/M2 | WEIGHT: 315 LBS | OXYGEN SATURATION: 97 % | SYSTOLIC BLOOD PRESSURE: 116 MMHG | RESPIRATION RATE: 16 BRPM | TEMPERATURE: 97.3 F | HEIGHT: 71 IN

## 2025-02-27 PROBLEM — Z72.0 TOBACCO ABUSE: Status: ACTIVE | Noted: 2025-02-27

## 2025-02-27 PROBLEM — N17.9 ACUTE RENAL FAILURE (HCC): Status: ACTIVE | Noted: 2025-02-27

## 2025-02-27 PROBLEM — I1A.0 RESISTANT HYPERTENSION: Status: ACTIVE | Noted: 2025-02-27

## 2025-02-27 LAB
ALBUMIN SERPL BCP-MCNC: 3.7 G/DL (ref 3.2–4.9)
ALBUMIN/GLOB SERPL: 1.3 G/DL
ALP SERPL-CCNC: 80 U/L (ref 30–99)
ALT SERPL-CCNC: 28 U/L (ref 2–50)
ANION GAP SERPL CALC-SCNC: 11 MMOL/L (ref 7–16)
AST SERPL-CCNC: 27 U/L (ref 12–45)
BASOPHILS # BLD AUTO: 0.7 % (ref 0–1.8)
BASOPHILS # BLD: 0.1 K/UL (ref 0–0.12)
BILIRUB SERPL-MCNC: 0.8 MG/DL (ref 0.1–1.5)
BUN SERPL-MCNC: 25 MG/DL (ref 8–22)
CALCIUM ALBUM COR SERPL-MCNC: 9 MG/DL (ref 8.5–10.5)
CALCIUM SERPL-MCNC: 8.8 MG/DL (ref 8.5–10.5)
CHLORIDE SERPL-SCNC: 101 MMOL/L (ref 96–112)
CO2 SERPL-SCNC: 22 MMOL/L (ref 20–33)
CREAT SERPL-MCNC: 1.51 MG/DL (ref 0.5–1.4)
EOSINOPHIL # BLD AUTO: 0.32 K/UL (ref 0–0.51)
EOSINOPHIL NFR BLD: 2.2 % (ref 0–6.9)
ERYTHROCYTE [DISTWIDTH] IN BLOOD BY AUTOMATED COUNT: 47.8 FL (ref 35.9–50)
GFR SERPLBLD CREATININE-BSD FMLA CKD-EPI: 58 ML/MIN/1.73 M 2
GLOBULIN SER CALC-MCNC: 2.8 G/DL (ref 1.9–3.5)
GLUCOSE SERPL-MCNC: 91 MG/DL (ref 65–99)
HCT VFR BLD AUTO: 54.5 % (ref 42–52)
HGB BLD-MCNC: 19.4 G/DL (ref 14–18)
IMM GRANULOCYTES # BLD AUTO: 0.07 K/UL (ref 0–0.11)
IMM GRANULOCYTES NFR BLD AUTO: 0.5 % (ref 0–0.9)
LYMPHOCYTES # BLD AUTO: 3.55 K/UL (ref 1–4.8)
LYMPHOCYTES NFR BLD: 24 % (ref 22–41)
MCH RBC QN AUTO: 32.9 PG (ref 27–33)
MCHC RBC AUTO-ENTMCNC: 35.6 G/DL (ref 32.3–36.5)
MCV RBC AUTO: 92.4 FL (ref 81.4–97.8)
MONOCYTES # BLD AUTO: 1.47 K/UL (ref 0–0.85)
MONOCYTES NFR BLD AUTO: 9.9 % (ref 0–13.4)
NEUTROPHILS # BLD AUTO: 9.31 K/UL (ref 1.82–7.42)
NEUTROPHILS NFR BLD: 62.7 % (ref 44–72)
NRBC # BLD AUTO: 0 K/UL
NRBC BLD-RTO: 0 /100 WBC (ref 0–0.2)
PLATELET # BLD AUTO: 195 K/UL (ref 164–446)
PMV BLD AUTO: 11.7 FL (ref 9–12.9)
POTASSIUM SERPL-SCNC: 3.3 MMOL/L (ref 3.6–5.5)
PROT SERPL-MCNC: 6.5 G/DL (ref 6–8.2)
RBC # BLD AUTO: 5.9 M/UL (ref 4.7–6.1)
SODIUM SERPL-SCNC: 134 MMOL/L (ref 135–145)
WBC # BLD AUTO: 14.8 K/UL (ref 4.8–10.8)

## 2025-02-27 PROCEDURE — 85025 COMPLETE CBC W/AUTO DIFF WBC: CPT

## 2025-02-27 PROCEDURE — A9270 NON-COVERED ITEM OR SERVICE: HCPCS | Performed by: HOSPITALIST

## 2025-02-27 PROCEDURE — 160048 HCHG OR STATISTICAL LEVEL 1-5: Performed by: ORTHOPAEDIC SURGERY

## 2025-02-27 PROCEDURE — 80053 COMPREHEN METABOLIC PANEL: CPT

## 2025-02-27 PROCEDURE — A9270 NON-COVERED ITEM OR SERVICE: HCPCS | Performed by: STUDENT IN AN ORGANIZED HEALTH CARE EDUCATION/TRAINING PROGRAM

## 2025-02-27 PROCEDURE — 99406 BEHAV CHNG SMOKING 3-10 MIN: CPT | Performed by: HOSPITALIST

## 2025-02-27 PROCEDURE — 700102 HCHG RX REV CODE 250 W/ 637 OVERRIDE(OP): Performed by: HOSPITALIST

## 2025-02-27 PROCEDURE — 160039 HCHG SURGERY MINUTES - EA ADDL 1 MIN LEVEL 3: Performed by: ORTHOPAEDIC SURGERY

## 2025-02-27 PROCEDURE — RXMED WILLOW AMBULATORY MEDICATION CHARGE: Performed by: STUDENT IN AN ORGANIZED HEALTH CARE EDUCATION/TRAINING PROGRAM

## 2025-02-27 PROCEDURE — 36415 COLL VENOUS BLD VENIPUNCTURE: CPT

## 2025-02-27 PROCEDURE — 700105 HCHG RX REV CODE 258: Performed by: HOSPITALIST

## 2025-02-27 PROCEDURE — 64417 NJX AA&/STRD AX NERVE IMG: CPT | Performed by: ORTHOPAEDIC SURGERY

## 2025-02-27 PROCEDURE — 99223 1ST HOSP IP/OBS HIGH 75: CPT | Mod: 25 | Performed by: HOSPITALIST

## 2025-02-27 PROCEDURE — 160028 HCHG SURGERY MINUTES - 1ST 30 MINS LEVEL 3: Performed by: ORTHOPAEDIC SURGERY

## 2025-02-27 PROCEDURE — 160035 HCHG PACU - 1ST 60 MINS PHASE I: Performed by: ORTHOPAEDIC SURGERY

## 2025-02-27 PROCEDURE — A9270 NON-COVERED ITEM OR SERVICE: HCPCS | Performed by: ANESTHESIOLOGY

## 2025-02-27 PROCEDURE — G0378 HOSPITAL OBSERVATION PER HR: HCPCS

## 2025-02-27 PROCEDURE — 99239 HOSP IP/OBS DSCHRG MGMT >30: CPT | Mod: DUPCHRG | Performed by: STUDENT IN AN ORGANIZED HEALTH CARE EDUCATION/TRAINING PROGRAM

## 2025-02-27 PROCEDURE — 700102 HCHG RX REV CODE 250 W/ 637 OVERRIDE(OP): Performed by: STUDENT IN AN ORGANIZED HEALTH CARE EDUCATION/TRAINING PROGRAM

## 2025-02-27 PROCEDURE — 73110 X-RAY EXAM OF WRIST: CPT | Mod: RT

## 2025-02-27 PROCEDURE — 700101 HCHG RX REV CODE 250: Performed by: ANESTHESIOLOGY

## 2025-02-27 PROCEDURE — 700102 HCHG RX REV CODE 250 W/ 637 OVERRIDE(OP): Performed by: ANESTHESIOLOGY

## 2025-02-27 PROCEDURE — 160002 HCHG RECOVERY MINUTES (STAT): Performed by: ORTHOPAEDIC SURGERY

## 2025-02-27 PROCEDURE — 110371 HCHG SHELL REV 272: Performed by: ORTHOPAEDIC SURGERY

## 2025-02-27 PROCEDURE — 73200 CT UPPER EXTREMITY W/O DYE: CPT | Mod: RT

## 2025-02-27 PROCEDURE — 700111 HCHG RX REV CODE 636 W/ 250 OVERRIDE (IP): Performed by: ANESTHESIOLOGY

## 2025-02-27 PROCEDURE — 160009 HCHG ANES TIME/MIN: Performed by: ORTHOPAEDIC SURGERY

## 2025-02-27 PROCEDURE — 160036 HCHG PACU - EA ADDL 30 MINS PHASE I: Performed by: ORTHOPAEDIC SURGERY

## 2025-02-27 PROCEDURE — 160015 HCHG STAT PREOP MINUTES: Performed by: ORTHOPAEDIC SURGERY

## 2025-02-27 PROCEDURE — C1713 ANCHOR/SCREW BN/BN,TIS/BN: HCPCS | Performed by: ORTHOPAEDIC SURGERY

## 2025-02-27 RX ORDER — CETIRIZINE HYDROCHLORIDE 10 MG/1
10 TABLET ORAL DAILY
Status: DISCONTINUED | OUTPATIENT
Start: 2025-02-27 | End: 2025-02-27 | Stop reason: HOSPADM

## 2025-02-27 RX ORDER — OXYCODONE HCL 5 MG/5 ML
10 SOLUTION, ORAL ORAL
Status: COMPLETED | OUTPATIENT
Start: 2025-02-27 | End: 2025-02-27

## 2025-02-27 RX ORDER — ALBUTEROL SULFATE 5 MG/ML
2.5 SOLUTION RESPIRATORY (INHALATION)
Status: DISCONTINUED | OUTPATIENT
Start: 2025-02-27 | End: 2025-02-27 | Stop reason: HOSPADM

## 2025-02-27 RX ORDER — OXYCODONE HCL 5 MG/5 ML
5 SOLUTION, ORAL ORAL
Status: COMPLETED | OUTPATIENT
Start: 2025-02-27 | End: 2025-02-27

## 2025-02-27 RX ORDER — METHOCARBAMOL 750 MG/1
750 TABLET, FILM COATED ORAL 4 TIMES DAILY
Qty: 40 TABLET | Refills: 0 | Status: SHIPPED | OUTPATIENT
Start: 2025-02-27 | End: 2025-03-09

## 2025-02-27 RX ORDER — VALSARTAN 80 MG/1
160 TABLET ORAL
Status: DISCONTINUED | OUTPATIENT
Start: 2025-02-27 | End: 2025-02-27 | Stop reason: HOSPADM

## 2025-02-27 RX ORDER — ROPIVACAINE HYDROCHLORIDE 5 MG/ML
INJECTION, SOLUTION EPIDURAL; INFILTRATION; PERINEURAL
Status: COMPLETED | OUTPATIENT
Start: 2025-02-27 | End: 2025-02-27

## 2025-02-27 RX ORDER — NIFEDIPINE 60 MG/1
60 TABLET, EXTENDED RELEASE ORAL DAILY
Status: DISCONTINUED | OUTPATIENT
Start: 2025-02-27 | End: 2025-02-27 | Stop reason: HOSPADM

## 2025-02-27 RX ORDER — CEFAZOLIN SODIUM 1 G/3ML
INJECTION, POWDER, FOR SOLUTION INTRAMUSCULAR; INTRAVENOUS PRN
Status: DISCONTINUED | OUTPATIENT
Start: 2025-02-27 | End: 2025-02-27 | Stop reason: SURG

## 2025-02-27 RX ORDER — CHLORTHALIDONE 25 MG/1
25 TABLET ORAL DAILY
Status: DISCONTINUED | OUTPATIENT
Start: 2025-02-27 | End: 2025-02-27 | Stop reason: HOSPADM

## 2025-02-27 RX ORDER — ONDANSETRON 2 MG/ML
4 INJECTION INTRAMUSCULAR; INTRAVENOUS
Status: DISCONTINUED | OUTPATIENT
Start: 2025-02-27 | End: 2025-02-27 | Stop reason: HOSPADM

## 2025-02-27 RX ORDER — METOPROLOL TARTRATE 25 MG/1
25 TABLET, FILM COATED ORAL 2 TIMES DAILY
Status: DISCONTINUED | OUTPATIENT
Start: 2025-02-27 | End: 2025-02-27 | Stop reason: HOSPADM

## 2025-02-27 RX ORDER — CEPHALEXIN 500 MG/1
500 CAPSULE ORAL 2 TIMES DAILY
Qty: 4 CAPSULE | Refills: 0 | Status: ACTIVE | OUTPATIENT
Start: 2025-02-27 | End: 2025-03-01

## 2025-02-27 RX ORDER — ONDANSETRON 2 MG/ML
INJECTION INTRAMUSCULAR; INTRAVENOUS PRN
Status: DISCONTINUED | OUTPATIENT
Start: 2025-02-27 | End: 2025-02-27 | Stop reason: SURG

## 2025-02-27 RX ORDER — ACETAMINOPHEN 500 MG
1000 TABLET ORAL 3 TIMES DAILY
Status: DISCONTINUED | OUTPATIENT
Start: 2025-02-27 | End: 2025-02-27 | Stop reason: HOSPADM

## 2025-02-27 RX ORDER — POTASSIUM CHLORIDE 1500 MG/1
40 TABLET, EXTENDED RELEASE ORAL ONCE
Status: COMPLETED | OUTPATIENT
Start: 2025-02-27 | End: 2025-02-27

## 2025-02-27 RX ORDER — DIPHENHYDRAMINE HYDROCHLORIDE 50 MG/ML
12.5 INJECTION, SOLUTION INTRAMUSCULAR; INTRAVENOUS
Status: DISCONTINUED | OUTPATIENT
Start: 2025-02-27 | End: 2025-02-27 | Stop reason: HOSPADM

## 2025-02-27 RX ORDER — HYDRALAZINE HYDROCHLORIDE 20 MG/ML
5 INJECTION INTRAMUSCULAR; INTRAVENOUS
Status: DISCONTINUED | OUTPATIENT
Start: 2025-02-27 | End: 2025-02-27 | Stop reason: HOSPADM

## 2025-02-27 RX ORDER — DEXAMETHASONE SODIUM PHOSPHATE 4 MG/ML
INJECTION, SOLUTION INTRA-ARTICULAR; INTRALESIONAL; INTRAMUSCULAR; INTRAVENOUS; SOFT TISSUE
Status: COMPLETED | OUTPATIENT
Start: 2025-02-27 | End: 2025-02-27

## 2025-02-27 RX ORDER — OMEPRAZOLE 20 MG/1
20 CAPSULE, DELAYED RELEASE ORAL DAILY
Status: DISCONTINUED | OUTPATIENT
Start: 2025-02-27 | End: 2025-02-27 | Stop reason: HOSPADM

## 2025-02-27 RX ORDER — DEXAMETHASONE SODIUM PHOSPHATE 4 MG/ML
INJECTION, SOLUTION INTRA-ARTICULAR; INTRALESIONAL; INTRAMUSCULAR; INTRAVENOUS; SOFT TISSUE PRN
Status: DISCONTINUED | OUTPATIENT
Start: 2025-02-27 | End: 2025-02-27 | Stop reason: SURG

## 2025-02-27 RX ORDER — MONTELUKAST SODIUM 10 MG/1
10 TABLET ORAL DAILY
Status: DISCONTINUED | OUTPATIENT
Start: 2025-02-27 | End: 2025-02-27 | Stop reason: HOSPADM

## 2025-02-27 RX ORDER — ACETAMINOPHEN 500 MG
1000 TABLET ORAL 3 TIMES DAILY
Qty: 60 TABLET | Refills: 0 | Status: SHIPPED | OUTPATIENT
Start: 2025-02-27 | End: 2025-03-09

## 2025-02-27 RX ORDER — LIDOCAINE HYDROCHLORIDE 20 MG/ML
INJECTION, SOLUTION EPIDURAL; INFILTRATION; INTRACAUDAL; PERINEURAL PRN
Status: DISCONTINUED | OUTPATIENT
Start: 2025-02-27 | End: 2025-02-27 | Stop reason: SURG

## 2025-02-27 RX ORDER — OXYCODONE HYDROCHLORIDE 5 MG/1
5 TABLET ORAL EVERY 6 HOURS PRN
Qty: 20 TABLET | Refills: 0 | Status: ON HOLD | OUTPATIENT
Start: 2025-02-27 | End: 2025-03-04

## 2025-02-27 RX ORDER — PHENYLEPHRINE HCL IN 0.9% NACL 1 MG/10 ML
SYRINGE (ML) INTRAVENOUS PRN
Status: DISCONTINUED | OUTPATIENT
Start: 2025-02-27 | End: 2025-02-27 | Stop reason: SURG

## 2025-02-27 RX ORDER — ATORVASTATIN CALCIUM 40 MG/1
80 TABLET, FILM COATED ORAL DAILY
Status: DISCONTINUED | OUTPATIENT
Start: 2025-02-27 | End: 2025-02-27 | Stop reason: HOSPADM

## 2025-02-27 RX ORDER — LABETALOL HYDROCHLORIDE 5 MG/ML
5 INJECTION, SOLUTION INTRAVENOUS
Status: DISCONTINUED | OUTPATIENT
Start: 2025-02-27 | End: 2025-02-27 | Stop reason: HOSPADM

## 2025-02-27 RX ORDER — METHOCARBAMOL 750 MG/1
750 TABLET, FILM COATED ORAL 2 TIMES DAILY
Status: DISCONTINUED | OUTPATIENT
Start: 2025-02-27 | End: 2025-02-27 | Stop reason: HOSPADM

## 2025-02-27 RX ADMIN — Medication 100 MCG: at 12:13

## 2025-02-27 RX ADMIN — METHOCARBAMOL 750 MG: 750 TABLET ORAL at 17:31

## 2025-02-27 RX ADMIN — SODIUM CHLORIDE, POTASSIUM CHLORIDE, SODIUM LACTATE AND CALCIUM CHLORIDE: 600; 310; 30; 20 INJECTION, SOLUTION INTRAVENOUS at 00:09

## 2025-02-27 RX ADMIN — Medication 100 MCG: at 12:05

## 2025-02-27 RX ADMIN — ATORVASTATIN CALCIUM 80 MG: 40 TABLET, FILM COATED ORAL at 05:26

## 2025-02-27 RX ADMIN — OXYCODONE HYDROCHLORIDE 10 MG: 10 TABLET ORAL at 08:16

## 2025-02-27 RX ADMIN — DEXAMETHASONE SODIUM PHOSPHATE 4 MG: 4 INJECTION, SOLUTION INTRA-ARTICULAR; INTRALESIONAL; INTRAMUSCULAR; INTRAVENOUS; SOFT TISSUE at 11:50

## 2025-02-27 RX ADMIN — DEXAMETHASONE SODIUM PHOSPHATE 4 MG: 4 INJECTION INTRA-ARTICULAR; INTRALESIONAL; INTRAMUSCULAR; INTRAVENOUS; SOFT TISSUE at 11:59

## 2025-02-27 RX ADMIN — OMEPRAZOLE 20 MG: 20 CAPSULE, DELAYED RELEASE ORAL at 05:25

## 2025-02-27 RX ADMIN — SUGAMMADEX 200 MG: 100 INJECTION, SOLUTION INTRAVENOUS at 12:59

## 2025-02-27 RX ADMIN — LIDOCAINE HYDROCHLORIDE 100 MG: 20 INJECTION, SOLUTION EPIDURAL; INFILTRATION; INTRACAUDAL; PERINEURAL at 11:40

## 2025-02-27 RX ADMIN — METHOCARBAMOL 750 MG: 750 TABLET ORAL at 05:26

## 2025-02-27 RX ADMIN — CETIRIZINE HYDROCHLORIDE 10 MG: 10 TABLET, FILM COATED ORAL at 05:26

## 2025-02-27 RX ADMIN — METOPROLOL TARTRATE 25 MG: 25 TABLET, FILM COATED ORAL at 17:31

## 2025-02-27 RX ADMIN — CHLORTHALIDONE 25 MG: 25 TABLET ORAL at 05:25

## 2025-02-27 RX ADMIN — ROPIVACAINE HYDROCHLORIDE 30 ML: 5 INJECTION EPIDURAL; INFILTRATION; PERINEURAL at 11:50

## 2025-02-27 RX ADMIN — ACETAMINOPHEN 1000 MG: 500 TABLET ORAL at 17:30

## 2025-02-27 RX ADMIN — MONTELUKAST 10 MG: 10 TABLET, FILM COATED ORAL at 05:26

## 2025-02-27 RX ADMIN — VALSARTAN 160 MG: 80 TABLET ORAL at 05:25

## 2025-02-27 RX ADMIN — OXYCODONE HYDROCHLORIDE 10 MG: 5 SOLUTION ORAL at 14:21

## 2025-02-27 RX ADMIN — POTASSIUM CHLORIDE 40 MEQ: 1500 TABLET, EXTENDED RELEASE ORAL at 08:15

## 2025-02-27 RX ADMIN — ONDANSETRON 4 MG: 2 INJECTION INTRAMUSCULAR; INTRAVENOUS at 11:59

## 2025-02-27 RX ADMIN — NIFEDIPINE 60 MG: 60 TABLET, EXTENDED RELEASE ORAL at 05:26

## 2025-02-27 RX ADMIN — PROPOFOL 250 MG: 10 INJECTION, EMULSION INTRAVENOUS at 11:40

## 2025-02-27 RX ADMIN — Medication 100 MCG: at 12:58

## 2025-02-27 RX ADMIN — FENTANYL CITRATE 100 MCG: 50 INJECTION, SOLUTION INTRAMUSCULAR; INTRAVENOUS at 11:40

## 2025-02-27 RX ADMIN — CEFAZOLIN 3 G: 1 INJECTION, POWDER, FOR SOLUTION INTRAMUSCULAR; INTRAVENOUS at 11:40

## 2025-02-27 RX ADMIN — METOPROLOL TARTRATE 25 MG: 25 TABLET, FILM COATED ORAL at 05:26

## 2025-02-27 SDOH — ECONOMIC STABILITY: TRANSPORTATION INSECURITY
IN THE PAST 12 MONTHS, HAS THE LACK OF TRANSPORTATION KEPT YOU FROM MEDICAL APPOINTMENTS OR FROM GETTING MEDICATIONS?: NO

## 2025-02-27 SDOH — ECONOMIC STABILITY: TRANSPORTATION INSECURITY
IN THE PAST 12 MONTHS, HAS LACK OF RELIABLE TRANSPORTATION KEPT YOU FROM MEDICAL APPOINTMENTS, MEETINGS, WORK OR FROM GETTING THINGS NEEDED FOR DAILY LIVING?: NO

## 2025-02-27 ASSESSMENT — LIFESTYLE VARIABLES
HOW MANY TIMES IN THE PAST YEAR HAVE YOU HAD 5 OR MORE DRINKS IN A DAY: 0
TOTAL SCORE: 0
EVER FELT BAD OR GUILTY ABOUT YOUR DRINKING: NO
ON A TYPICAL DAY WHEN YOU DRINK ALCOHOL HOW MANY DRINKS DO YOU HAVE: 0
DOES PATIENT WANT TO STOP DRINKING: NO
HAVE PEOPLE ANNOYED YOU BY CRITICIZING YOUR DRINKING: NO
TOTAL SCORE: 0
TOTAL SCORE: 0
ALCOHOL_USE: NO
HAVE YOU EVER FELT YOU SHOULD CUT DOWN ON YOUR DRINKING: NO
SUBSTANCE_ABUSE: 0
CONSUMPTION TOTAL: NEGATIVE
AVERAGE NUMBER OF DAYS PER WEEK YOU HAVE A DRINK CONTAINING ALCOHOL: 0
EVER HAD A DRINK FIRST THING IN THE MORNING TO STEADY YOUR NERVES TO GET RID OF A HANGOVER: NO

## 2025-02-27 ASSESSMENT — ENCOUNTER SYMPTOMS
VOMITING: 0
PHOTOPHOBIA: 0
ABDOMINAL PAIN: 0
PND: 0
HEARTBURN: 0
HEADACHES: 0
POLYDIPSIA: 0
ORTHOPNEA: 0
WEAKNESS: 0
FALLS: 0
NAUSEA: 0
HALLUCINATIONS: 0
MYALGIAS: 0
DIZZINESS: 0
CLAUDICATION: 0
FLANK PAIN: 0
BRUISES/BLEEDS EASILY: 0
TREMORS: 0
PALPITATIONS: 0
BACK PAIN: 0
SINUS PAIN: 0
DOUBLE VISION: 0
BLOOD IN STOOL: 0
HEMOPTYSIS: 0
CHILLS: 0
DIAPHORESIS: 0
CONSTIPATION: 0
BLURRED VISION: 0
EYE PAIN: 0
SHORTNESS OF BREATH: 0
FEVER: 0
DEPRESSION: 0
NECK PAIN: 0
SORE THROAT: 0
DIARRHEA: 0
SPUTUM PRODUCTION: 0
STRIDOR: 0
WHEEZING: 0
COUGH: 0
TINGLING: 0

## 2025-02-27 ASSESSMENT — SOCIAL DETERMINANTS OF HEALTH (SDOH)
WITHIN THE LAST YEAR, HAVE TO BEEN RAPED OR FORCED TO HAVE ANY KIND OF SEXUAL ACTIVITY BY YOUR PARTNER OR EX-PARTNER?: NO
WITHIN THE PAST 12 MONTHS, YOU WORRIED THAT YOUR FOOD WOULD RUN OUT BEFORE YOU GOT THE MONEY TO BUY MORE: NEVER TRUE
WITHIN THE LAST YEAR, HAVE YOU BEEN AFRAID OF YOUR PARTNER OR EX-PARTNER?: NO
IN THE PAST 12 MONTHS, HAS THE ELECTRIC, GAS, OIL, OR WATER COMPANY THREATENED TO SHUT OFF SERVICE IN YOUR HOME?: NO
WITHIN THE LAST YEAR, HAVE YOU BEEN KICKED, HIT, SLAPPED, OR OTHERWISE PHYSICALLY HURT BY YOUR PARTNER OR EX-PARTNER?: NO
WITHIN THE LAST YEAR, HAVE YOU BEEN HUMILIATED OR EMOTIONALLY ABUSED IN OTHER WAYS BY YOUR PARTNER OR EX-PARTNER?: NO
WITHIN THE PAST 12 MONTHS, THE FOOD YOU BOUGHT JUST DIDN'T LAST AND YOU DIDN'T HAVE MONEY TO GET MORE: NEVER TRUE

## 2025-02-27 ASSESSMENT — COGNITIVE AND FUNCTIONAL STATUS - GENERAL
SUGGESTED CMS G CODE MODIFIER DAILY ACTIVITY: CK
SUGGESTED CMS G CODE MODIFIER MOBILITY: CJ
TOILETING: A LITTLE
STANDING UP FROM CHAIR USING ARMS: A LOT
DRESSING REGULAR UPPER BODY CLOTHING: TOTAL
MOBILITY SCORE: 22
DRESSING REGULAR LOWER BODY CLOTHING: A LOT
DAILY ACTIVITIY SCORE: 14
HELP NEEDED FOR BATHING: A LOT
EATING MEALS: A LITTLE
PERSONAL GROOMING: A LITTLE

## 2025-02-27 ASSESSMENT — PAIN DESCRIPTION - PAIN TYPE
TYPE: ACUTE PAIN
TYPE: SURGICAL PAIN
TYPE: SURGICAL PAIN
TYPE: ACUTE PAIN
TYPE: SURGICAL PAIN

## 2025-02-27 ASSESSMENT — PATIENT HEALTH QUESTIONNAIRE - PHQ9
SUM OF ALL RESPONSES TO PHQ9 QUESTIONS 1 AND 2: 0
1. LITTLE INTEREST OR PLEASURE IN DOING THINGS: NOT AT ALL
2. FEELING DOWN, DEPRESSED, IRRITABLE, OR HOPELESS: NOT AT ALL

## 2025-02-27 ASSESSMENT — FIBROSIS 4 INDEX: FIB4 SCORE: 1.23

## 2025-02-27 ASSESSMENT — PAIN SCALES - GENERAL: PAIN_LEVEL: 4
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
## 2025-02-27 NOTE — DISCHARGE PLANNING
Patient status updated to Observation status per attending MD determination, Dr. Otoole, and UR committee MD secondary review, Dr. Barnes. Patient Status Update completed.
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
## 2025-02-27 NOTE — CARE PLAN
Problem: Pain - Standard  Goal: Alleviation of pain or a reduction in pain to the patient’s comfort goal  Description: Target End Date:  Prior to discharge or change in level of care    Document on Vitals flowsheet    1.  Document pain using the appropriate pain scale per order or unit policy  2.  Educate and implement non-pharmacologic comfort measures (i.e. relaxation, distraction, massage, cold/heat therapy, etc.)  3.  Pain management medications as ordered  4.  Reassess pain after pain med administration per policy  5.  If opiods administered assess patient's response to pain medication is appropriate per POSS sedation scale  6.  Follow pain management plan developed in collaboration with patient and interdisciplinary team (including palliative care or pain specialists if applicable)  Outcome: Met     Problem: Pain - Standard  Goal: Alleviation of pain or a reduction in pain to the patient’s comfort goal  Description: Target End Date:  Prior to discharge or change in level of care    Document on Vitals flowsheet    1.  Document pain using the appropriate pain scale per order or unit policy  2.  Educate and implement non-pharmacologic comfort measures (i.e. relaxation, distraction, massage, cold/heat therapy, etc.)  3.  Pain management medications as ordered  4.  Reassess pain after pain med administration per policy  5.  If opiods administered assess patient's response to pain medication is appropriate per POSS sedation scale  6.  Follow pain management plan developed in collaboration with patient and interdisciplinary team (including palliative care or pain specialists if applicable)  Outcome: Met     Problem: Knowledge Deficit - Standard  Goal: Patient and family/care givers will demonstrate understanding of plan of care, disease process/condition, diagnostic tests and medications  Description: Target End Date:  1-3 days or as soon as patient condition allows    Document in Patient Education    1.  Patient  and family/caregiver oriented to unit, equipment, visitation policy and means for communicating concern  2.  Complete/review Learning Assessment  3.  Assess knowledge level of disease process/condition, treatment plan, diagnostic tests and medications  4.  Explain disease process/condition, treatment plan, diagnostic tests and medications  Outcome: Met     Problem: Knowledge Deficit - Standard  Goal: Patient and family/care givers will demonstrate understanding of plan of care, disease process/condition, diagnostic tests and medications  Description: Target End Date:  1-3 days or as soon as patient condition allows    Document in Patient Education    1.  Patient and family/caregiver oriented to unit, equipment, visitation policy and means for communicating concern  2.  Complete/review Learning Assessment  3.  Assess knowledge level of disease process/condition, treatment plan, diagnostic tests and medications  4.  Explain disease process/condition, treatment plan, diagnostic tests and medications  Outcome: Met   The patient is Stable - Low risk of patient condition declining or worsening    Shift Goals  Clinical Goals: Pain control, safe mobility, NPO sips with meds at MN  Patient Goals: Pain control, rest, comfort  Family Goals: Update with POC    Progress made toward(s) clinical / shift goals:      Pt. Vital signs WDL.   Pt. Verbalized understanding on the care provided.   Pt. Didn't complain of pain under RN care.

## 2025-02-27 NOTE — DIETARY
NUTRITION SERVICES: BMI - Pt with BMI >40 (=Body mass index is 45.2 kg/m².), morbid obesity. Unknown weight measurement type. Weight loss counseling not appropriate in acute care setting.     RECOMMEND - If appropriate at DC please refer to outpatient nutrition services for weight management.

## 2025-02-27 NOTE — DISCHARGE INSTRUCTIONS
Please call the attached number to schedule a follow-up appointment with Dr. Napoles, the orthopedic surgeon for further management of your broken wrist.    You should take Tylenol 1000 mg and ibuprofen 600 mg every 6 hours as needed for pain.  Elevate your extremity and apply ice over your splint for 15 minutes 4-5 times daily.    Return to the ER with any new numbness or weakness of your fingers, severe pain.     PLAN:   1.  The patient will be discharged home later today if he is otherwise doing   well.  2.  He should be nonweightbearing to the right upper extremity with external   fixator, which he should keep the dressing around clean and dry.  3.  He should follow up early next week to discuss further surgery.  4.  I will obtain a postoperative CT given that he is in improved alignment   and out to length for helping with a staged surgical planning.

## 2025-02-27 NOTE — ANESTHESIA PROCEDURE NOTES
Airway    Date/Time: 2/27/2025 11:41 AM    Performed by: Laura Martin M.D.  Authorized by: Laura Martin M.D.    Location:  OR  Urgency:  Elective  Difficult Airway: No    Indications for Airway Management:  Anesthesia      Spontaneous Ventilation: absent    Sedation Level:  Deep  Preoxygenated: Yes    Patient Position:  Sniffing  MILS Maintained Throughout: No    Mask Difficulty Assessment:  0 - not attempted  Final Airway Type:  Supraglottic airway  Final Supraglottic Airway:  Standard LMA    SGA Size:  5  Number of Attempts at Approach:  1  Number of Other Approaches Attempted:  0

## 2025-02-27 NOTE — ANESTHESIA PROCEDURE NOTES
Peripheral Block    Date/Time: 2/27/2025 11:50 AM    Performed by: Laura Martin M.D.  Authorized by: Laura Martin M.D.    Patient Location:  OR  Start Time:  2/27/2025 11:50 AM  End Time:  2/27/2025 11:54 AM  Reason for Block: at surgeon's request and post-op pain management ONLY    patient identified, IV checked, site marked, risks and benefits discussed, surgical consent, monitors and equipment checked, pre-op evaluation and timeout performed    Patient Position:  Supine  Prep: ChloraPrep    Monitoring:  Heart rate, continuous pulse ox and cardiac monitor  Block Region:  Upper Extremity  Upper Extremity - Block Type:  BRACHIAL PLEXUS block, axillary approach    Laterality:  Right  Procedures: ultrasound guided  Image captured, interpreted and electronically stored.  Local Infiltration:  Lidocaine  Block Type:  Single-shot  Needle Length:  100mm  Needle Gauge:  21 G  Needle Localization:  Ultrasound guidance  Ultrasound picture in chart  Injection Assessment:  Negative aspiration for heme, no paresthesia on injection, incremental injection and local visualized surrounding nerve on ultrasound   US Guided Right Axillary Block   US probe placed in the right axilla at intersection of pec major and biceps muscles.  The right Axillary Artery (AA) identified with Median (superficial), Radial (deep) and Ulnar (caudad) nerves surrounding artery.  Needle inserted cephalad to probe in an in plane approach to a perivascular/perineural position.  After negative aspiration, LA injected with ease in divided doses and visualized surrounding the artery and nerves.
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
## 2025-02-27 NOTE — ANESTHESIA PROCEDURE NOTES
Airway    Date/Time: 2/27/2025 11:44 AM    Performed by: Laura Martin M.D.  Authorized by: Laura Martin M.D.    Location:  OR  Urgency:  Elective  Difficult Airway: No    Indications for Airway Management:  Anesthesia      Spontaneous Ventilation: absent    Sedation Level:  Deep  Preoxygenated: Yes    Patient Position:  Sniffing  MILS Maintained Throughout: No    Mask Difficulty Assessment:  0 - not attempted  Final Airway Type:  Endotracheal airway  Final Endotracheal Airway:  ETT  Cuffed: Yes    Technique Used for Successful ETT Placement:  Direct laryngoscopy  Devices/Methods Used in Placement:  Anterior pressure/BURP    Insertion Site:  Oral  Blade Type:  Dony  Laryngoscope Blade/Videolaryngoscope Blade Size:  4  ETT Size (mm):  7.5  Measured from:  Lips  ETT to Lips (cm):  24  Placement Verified by: auscultation and capnometry    Cormack-Lehane Classification:  Grade IIa - partial view of glottis  Number of Attempts at Approach:  1  Number of Other Approaches Attempted:  0   ETT inserted after poor seal of #5 LMA
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
## 2025-02-27 NOTE — ANESTHESIA TIME REPORT
Anesthesia Start and Stop Event Times       Date Time Event    2/27/2025 1115 Ready for Procedure     1135 Anesthesia Start     1328 Anesthesia Stop          Responsible Staff  02/27/25      Name Role Begin End    Laura Martin M.D. Anesth 1135 1328          Overtime Reason:  no overtime (within assigned shift)    Comments:

## 2025-02-27 NOTE — PROGRESS NOTES
Pt. Refused to be on  and refused bed alarm. Pt. Was provided with safety education and the importance of having . Charge RN was made aware.

## 2025-02-27 NOTE — ED NOTES
Med Rec completed per patient and wife at bedside     Allergies reviewed: yes    Antibiotics in the past 30 days: no    Anticoagulant in past 14 days: no    Pharmacy patient utilizes: [facility]  [phone]     Per patient  Nifedipine ER 60 mg placed the amlodipine 10 mg   He injects 12.5 mg of Mounjaro 12.5 mg /0.5 ml on Sundays. Last Dose: 02/23/2025  50 mg of Testosterone 200 mg/ml is injected weekly on Wednesdays. Last Dose: 02/19/2025. Patient has not received his dose today.

## 2025-02-27 NOTE — OR NURSING
1321: Pt arrived via bed with anesthesia and RN. Report received. See flowsheet for VS. Dressing CDI. Ex-fix in place. RUE pink, warm, cap refill less than 3 seconds. Orders reviewed and initiated.  1534: Report called to [facility]. All questions answered. VSS. Dressing CDI. No patient distress. Alert and oriented x4. Pt transported to room in stable condition on 5L oxymask with no personal belongings. Family updated.
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
## 2025-02-27 NOTE — OR SURGEON
Immediate Post OP Note    PreOp Diagnosis: Right comminuted, displaced intraarticular distal radius fracture      PostOp Diagnosis: same      Procedure(s):  APPLICATION, EXTERNAL FIXATION DEVICE - Wound Class: Clean    Surgeon(s):  Mukul Napoles M.D.    Anesthesiologist/Type of Anesthesia:  Anesthesiologist: Laura Martin M.D./General    Surgical Staff:  Circulator: Jessica Michaud R.N.  Relief Circulator: Cintia Boyer R.N.  Relief Scrub: Howard Hoskins  Scrub Person: Eva Chavez  Radiology Technologist: Danika Morales    Specimens removed if any:  * No specimens in log *    Estimated Blood Loss: minimal    Findings: see dictation    Complications: none known    PLAN:  --readmit postop, okay for discharge to home later today if doing well  --NWB RUE in external fixator  --keep exfix and dressing c/d/I  --fu early next week to discuss further surgery        2/27/2025 1:07 PM Mukul Napoles M.D.
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
## 2025-02-27 NOTE — ANESTHESIA POSTPROCEDURE EVALUATION
Patient: Ken Avilez    Procedure Summary       Date: 02/27/25 Room / Location: [place] [address] / SURGERY [facility]    Anesthesia Start: 1135 Anesthesia Stop: 1328    Procedure: APPLICATION, EXTERNAL FIXATION DEVICE (Right: Wrist) Diagnosis: (Right distal radius fracture)    Surgeons: Mukul Napoles M.D. Responsible Provider: Laura Martin M.D.    Anesthesia Type: general, peripheral nerve block ASA Status: 3            Final Anesthesia Type: general, peripheral nerve block  Last vitals  BP   Blood Pressure: 121/58    Temp   37.1 °C (98.7 °F)    Pulse   93   Resp   17    SpO2   97 %      Anesthesia Post Evaluation    Patient location during evaluation: PACU  Patient participation: complete - patient participated  Level of consciousness: awake and alert  Pain score: 4    Airway patency: patent  Anesthetic complications: no  Cardiovascular status: hemodynamically stable  Respiratory status: acceptable  Hydration status: euvolemic    PONV: none    patient able to participate, but full recovery from regional anesthesia has not occurred and is not expected within the stipulated timeframe for the completion of the evaluation      No notable events documented.     Nurse Pain Score: 4 (NPRS)

## 2025-02-27 NOTE — ASSESSMENT & PLAN NOTE
Tobacco cessation education provided for more than 5 minutes.  I explained to patient that he is going to have resistant hypertension and poor wound healing if he continues to smoke cigarettes.  We discussed options of nicotine patch, wellbutrin and chantix.  The patient has the intention to quit smoking. Nicotine replacement protocol will be provided to the patient.

## 2025-02-27 NOTE — ASSESSMENT & PLAN NOTE
Likely prerenal  IV fluid hydration  Monitor BMP and assess response  Avoid IV contrast/nephrotoxins/NSAIDs  Dose adjust meds for decreased GFR

## 2025-02-27 NOTE — PROGRESS NOTES
Pt. Was transported to the floor via  gurney. Pt. Was A&Ox4, on RA. No signs and symptoms of distress.  Pt. Was transferred to bed safely, belongings and call light within reach.

## 2025-02-28 NOTE — Clinical Note
REFERRAL APPROVAL NOTICE         Sent on February 28, 2025                   Ken Avilez  [address] [facility] [address]                   Dear Mr. Avilez,    After a careful review of the medical information and benefit coverage, [facility] has processed your referral. See below for additional details.    If applicable, you must be actively enrolled with your insurance for coverage of the authorized service. If you have any questions regarding your coverage, please contact your insurance directly.    REFERRAL INFORMATION   Referral #:  [id number]  Referred-To Provider    Referred-By Provider:  Orthopedic Surgery    Sadi Siddiqi M.D.   [facility]      [address]  [address]  [place] [address]  [phone] [id number]  [address]  [facility] [address]  [phone]    Referral Start Date:  02/26/2025  Referral End Date:   02/26/2026             SCHEDULING  If you do not already have an appointment, please call [phone] to make an appointment.     MORE INFORMATION  If you do not already have a [email or web address] account, sign up at: [email or web address]  You can access your medical information, make appointments, see lab results, billing information, and more.  If you have questions regarding this referral, please contact  the [facility] Referrals department at:             [phone]. Monday - Friday 8:00AM - 5:00PM.     Sincerely,    [facility]
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
## 2025-02-28 NOTE — CARE PLAN
Problem: Safety  Goal: Will remain free from falls  Outcome: Progressing     Problem: Pain - Standard  Goal: Alleviation of pain or a reduction in pain to the patient’s comfort goal  Outcome: Progressing    Pt was medicated per MAR and remained within the stated comfort goal.     The patient is Stable - Low risk of patient condition declining or worsening    Shift Goals  Clinical Goals: Pain control 7/10, surgery  Patient Goals: Comfort, surgery  Family Goals: Update with POC    Progress made toward(s) clinical / shift goals:  Yes    Patient is not progressing towards the following goals:

## 2025-03-03 ENCOUNTER — APPOINTMENT (OUTPATIENT)
Dept: ADMISSIONS | Facility: MEDICAL CENTER | Age: 45
End: 2025-03-03
Attending: ORTHOPAEDIC SURGERY
Payer: COMMERCIAL

## 2025-03-04 ENCOUNTER — HOSPITAL ENCOUNTER (OUTPATIENT)
Facility: MEDICAL CENTER | Age: 45
End: 2025-03-04
Attending: ORTHOPAEDIC SURGERY | Admitting: ORTHOPAEDIC SURGERY
Payer: COMMERCIAL

## 2025-03-04 ENCOUNTER — ANESTHESIA (OUTPATIENT)
Dept: SURGERY | Facility: MEDICAL CENTER | Age: 45
End: 2025-03-04
Payer: COMMERCIAL

## 2025-03-04 ENCOUNTER — ANESTHESIA EVENT (OUTPATIENT)
Dept: SURGERY | Facility: MEDICAL CENTER | Age: 45
End: 2025-03-04
Payer: COMMERCIAL

## 2025-03-04 ENCOUNTER — APPOINTMENT (OUTPATIENT)
Dept: RADIOLOGY | Facility: MEDICAL CENTER | Age: 45
End: 2025-03-04
Attending: ORTHOPAEDIC SURGERY
Payer: COMMERCIAL

## 2025-03-04 VITALS
TEMPERATURE: 97.6 F | BODY MASS INDEX: 41.42 KG/M2 | WEIGHT: 295.86 LBS | SYSTOLIC BLOOD PRESSURE: 163 MMHG | OXYGEN SATURATION: 93 % | HEART RATE: 83 BPM | DIASTOLIC BLOOD PRESSURE: 82 MMHG | HEIGHT: 71 IN | RESPIRATION RATE: 16 BRPM

## 2025-03-04 DIAGNOSIS — G89.18 ACUTE POSTOPERATIVE PAIN: ICD-10-CM

## 2025-03-04 LAB — EKG IMPRESSION: NORMAL

## 2025-03-04 PROCEDURE — A9270 NON-COVERED ITEM OR SERVICE: HCPCS | Performed by: ANESTHESIOLOGY

## 2025-03-04 PROCEDURE — 700111 HCHG RX REV CODE 636 W/ 250 OVERRIDE (IP): Performed by: ANESTHESIOLOGY

## 2025-03-04 PROCEDURE — 160046 HCHG PACU - 1ST 60 MINS PHASE II: Performed by: ORTHOPAEDIC SURGERY

## 2025-03-04 PROCEDURE — 160025 RECOVERY II MINUTES (STATS): Performed by: ORTHOPAEDIC SURGERY

## 2025-03-04 PROCEDURE — 160028 HCHG SURGERY MINUTES - 1ST 30 MINS LEVEL 3: Performed by: ORTHOPAEDIC SURGERY

## 2025-03-04 PROCEDURE — 160015 HCHG STAT PREOP MINUTES: Performed by: ORTHOPAEDIC SURGERY

## 2025-03-04 PROCEDURE — 700101 HCHG RX REV CODE 250: Performed by: ORTHOPAEDIC SURGERY

## 2025-03-04 PROCEDURE — 700105 HCHG RX REV CODE 258: Performed by: ORTHOPAEDIC SURGERY

## 2025-03-04 PROCEDURE — 700102 HCHG RX REV CODE 250 W/ 637 OVERRIDE(OP): Performed by: ANESTHESIOLOGY

## 2025-03-04 PROCEDURE — 73110 X-RAY EXAM OF WRIST: CPT | Mod: RT

## 2025-03-04 PROCEDURE — 110371 HCHG SHELL REV 272: Performed by: ORTHOPAEDIC SURGERY

## 2025-03-04 PROCEDURE — C1713 ANCHOR/SCREW BN/BN,TIS/BN: HCPCS | Performed by: ORTHOPAEDIC SURGERY

## 2025-03-04 PROCEDURE — 160039 HCHG SURGERY MINUTES - EA ADDL 1 MIN LEVEL 3: Performed by: ORTHOPAEDIC SURGERY

## 2025-03-04 PROCEDURE — 160009 HCHG ANES TIME/MIN: Performed by: ORTHOPAEDIC SURGERY

## 2025-03-04 PROCEDURE — 93010 ELECTROCARDIOGRAM REPORT: CPT | Performed by: INTERNAL MEDICINE

## 2025-03-04 PROCEDURE — 160048 HCHG OR STATISTICAL LEVEL 1-5: Performed by: ORTHOPAEDIC SURGERY

## 2025-03-04 PROCEDURE — 502000 HCHG MISC OR IMPLANTS RC 0278: Performed by: ORTHOPAEDIC SURGERY

## 2025-03-04 PROCEDURE — 700101 HCHG RX REV CODE 250: Performed by: ANESTHESIOLOGY

## 2025-03-04 PROCEDURE — 160035 HCHG PACU - 1ST 60 MINS PHASE I: Performed by: ORTHOPAEDIC SURGERY

## 2025-03-04 PROCEDURE — 93005 ELECTROCARDIOGRAM TRACING: CPT | Mod: TC | Performed by: ORTHOPAEDIC SURGERY

## 2025-03-04 PROCEDURE — 160002 HCHG RECOVERY MINUTES (STAT): Performed by: ORTHOPAEDIC SURGERY

## 2025-03-04 DEVICE — SCREW BONE T8 FULL THREAD L22 MM OD2.7 MM LOCK STARDRIVE NONSTERILE VARIAX FOOT PLATE SYSTEM: Type: IMPLANTABLE DEVICE | Site: ARM | Status: FUNCTIONAL

## 2025-03-04 DEVICE — SCREW BONE T8 FULL THREAD L16 MM OD2.7 MM STARDRIVE NONSTERILE VARIAX FOOT PLATE SYSTEM: Type: IMPLANTABLE DEVICE | Site: ARM | Status: FUNCTIONAL

## 2025-03-04 DEVICE — IMPLANTABLE DEVICE: Type: IMPLANTABLE DEVICE | Site: ARM | Status: FUNCTIONAL

## 2025-03-04 DEVICE — WIRE FIXATION KIRSCHNER STAINLESS STEEL L150 MM OD1.25 MM TROCAR POINT NONSTERILE: Type: IMPLANTABLE DEVICE | Site: ARM | Status: FUNCTIONAL

## 2025-03-04 RX ORDER — PHENYLEPHRINE HCL IN 0.9% NACL 1 MG/10 ML
SYRINGE (ML) INTRAVENOUS PRN
Status: DISCONTINUED | OUTPATIENT
Start: 2025-03-04 | End: 2025-03-04 | Stop reason: SURG

## 2025-03-04 RX ORDER — LABETALOL HYDROCHLORIDE 5 MG/ML
5 INJECTION, SOLUTION INTRAVENOUS
Status: DISCONTINUED | OUTPATIENT
Start: 2025-03-04 | End: 2025-03-04 | Stop reason: HOSPADM

## 2025-03-04 RX ORDER — HYDROMORPHONE HYDROCHLORIDE 1 MG/ML
0.4 INJECTION, SOLUTION INTRAMUSCULAR; INTRAVENOUS; SUBCUTANEOUS
Status: DISCONTINUED | OUTPATIENT
Start: 2025-03-04 | End: 2025-03-04 | Stop reason: HOSPADM

## 2025-03-04 RX ORDER — SODIUM CHLORIDE, SODIUM LACTATE, POTASSIUM CHLORIDE, CALCIUM CHLORIDE 600; 310; 30; 20 MG/100ML; MG/100ML; MG/100ML; MG/100ML
INJECTION, SOLUTION INTRAVENOUS CONTINUOUS
Status: ACTIVE | OUTPATIENT
Start: 2025-03-04 | End: 2025-03-04

## 2025-03-04 RX ORDER — CEPHALEXIN 500 MG/1
500 CAPSULE ORAL 2 TIMES DAILY
COMMUNITY

## 2025-03-04 RX ORDER — ALBUTEROL SULFATE 5 MG/ML
2.5 SOLUTION RESPIRATORY (INHALATION)
Status: DISCONTINUED | OUTPATIENT
Start: 2025-03-04 | End: 2025-03-04 | Stop reason: HOSPADM

## 2025-03-04 RX ORDER — METOPROLOL SUCCINATE 100 MG/1
100 TABLET, EXTENDED RELEASE ORAL DAILY
COMMUNITY

## 2025-03-04 RX ORDER — DEXAMETHASONE SODIUM PHOSPHATE 4 MG/ML
INJECTION, SOLUTION INTRA-ARTICULAR; INTRALESIONAL; INTRAMUSCULAR; INTRAVENOUS; SOFT TISSUE PRN
Status: DISCONTINUED | OUTPATIENT
Start: 2025-03-04 | End: 2025-03-04 | Stop reason: SURG

## 2025-03-04 RX ORDER — AMLODIPINE BESYLATE 10 MG/1
10 TABLET ORAL DAILY
COMMUNITY

## 2025-03-04 RX ORDER — BUPIVACAINE HYDROCHLORIDE AND EPINEPHRINE 5; 5 MG/ML; UG/ML
INJECTION, SOLUTION PERINEURAL
Status: DISCONTINUED | OUTPATIENT
Start: 2025-03-04 | End: 2025-03-04 | Stop reason: HOSPADM

## 2025-03-04 RX ORDER — OXYCODONE HCL 5 MG/5 ML
5 SOLUTION, ORAL ORAL
Status: COMPLETED | OUTPATIENT
Start: 2025-03-04 | End: 2025-03-04

## 2025-03-04 RX ORDER — SODIUM CHLORIDE, SODIUM LACTATE, POTASSIUM CHLORIDE, CALCIUM CHLORIDE 600; 310; 30; 20 MG/100ML; MG/100ML; MG/100ML; MG/100ML
INJECTION, SOLUTION INTRAVENOUS CONTINUOUS
Status: DISCONTINUED | OUTPATIENT
Start: 2025-03-04 | End: 2025-03-04 | Stop reason: HOSPADM

## 2025-03-04 RX ORDER — HALOPERIDOL 5 MG/ML
1 INJECTION INTRAMUSCULAR
Status: DISCONTINUED | OUTPATIENT
Start: 2025-03-04 | End: 2025-03-04 | Stop reason: HOSPADM

## 2025-03-04 RX ORDER — MIDAZOLAM HYDROCHLORIDE 1 MG/ML
INJECTION INTRAMUSCULAR; INTRAVENOUS PRN
Status: DISCONTINUED | OUTPATIENT
Start: 2025-03-04 | End: 2025-03-04 | Stop reason: SURG

## 2025-03-04 RX ORDER — CEFAZOLIN SODIUM 1 G/3ML
INJECTION, POWDER, FOR SOLUTION INTRAMUSCULAR; INTRAVENOUS PRN
Status: DISCONTINUED | OUTPATIENT
Start: 2025-03-04 | End: 2025-03-04 | Stop reason: SURG

## 2025-03-04 RX ORDER — ONDANSETRON 2 MG/ML
INJECTION INTRAMUSCULAR; INTRAVENOUS PRN
Status: DISCONTINUED | OUTPATIENT
Start: 2025-03-04 | End: 2025-03-04 | Stop reason: SURG

## 2025-03-04 RX ORDER — HYDROMORPHONE HYDROCHLORIDE 1 MG/ML
0.1 INJECTION, SOLUTION INTRAMUSCULAR; INTRAVENOUS; SUBCUTANEOUS
Status: DISCONTINUED | OUTPATIENT
Start: 2025-03-04 | End: 2025-03-04 | Stop reason: HOSPADM

## 2025-03-04 RX ORDER — ONDANSETRON 2 MG/ML
4 INJECTION INTRAMUSCULAR; INTRAVENOUS
Status: DISCONTINUED | OUTPATIENT
Start: 2025-03-04 | End: 2025-03-04 | Stop reason: HOSPADM

## 2025-03-04 RX ORDER — LIDOCAINE HYDROCHLORIDE 20 MG/ML
INJECTION, SOLUTION EPIDURAL; INFILTRATION; INTRACAUDAL; PERINEURAL PRN
Status: DISCONTINUED | OUTPATIENT
Start: 2025-03-04 | End: 2025-03-04 | Stop reason: SURG

## 2025-03-04 RX ORDER — ACETAMINOPHEN 500 MG
1000 TABLET ORAL ONCE
Status: COMPLETED | OUTPATIENT
Start: 2025-03-04 | End: 2025-03-04

## 2025-03-04 RX ORDER — HYDROMORPHONE HYDROCHLORIDE 2 MG/ML
INJECTION, SOLUTION INTRAMUSCULAR; INTRAVENOUS; SUBCUTANEOUS PRN
Status: DISCONTINUED | OUTPATIENT
Start: 2025-03-04 | End: 2025-03-04 | Stop reason: SURG

## 2025-03-04 RX ORDER — MIDAZOLAM HYDROCHLORIDE 1 MG/ML
1 INJECTION INTRAMUSCULAR; INTRAVENOUS
Status: DISCONTINUED | OUTPATIENT
Start: 2025-03-04 | End: 2025-03-04 | Stop reason: HOSPADM

## 2025-03-04 RX ORDER — HYDROMORPHONE HYDROCHLORIDE 1 MG/ML
0.2 INJECTION, SOLUTION INTRAMUSCULAR; INTRAVENOUS; SUBCUTANEOUS
Status: DISCONTINUED | OUTPATIENT
Start: 2025-03-04 | End: 2025-03-04 | Stop reason: HOSPADM

## 2025-03-04 RX ORDER — DIPHENHYDRAMINE HYDROCHLORIDE 50 MG/ML
12.5 INJECTION, SOLUTION INTRAMUSCULAR; INTRAVENOUS
Status: DISCONTINUED | OUTPATIENT
Start: 2025-03-04 | End: 2025-03-04 | Stop reason: HOSPADM

## 2025-03-04 RX ORDER — KETOROLAC TROMETHAMINE 15 MG/ML
15 INJECTION, SOLUTION INTRAMUSCULAR; INTRAVENOUS ONCE
Status: DISCONTINUED | OUTPATIENT
Start: 2025-03-04 | End: 2025-03-04 | Stop reason: HOSPADM

## 2025-03-04 RX ORDER — OXYCODONE HCL 5 MG/5 ML
10 SOLUTION, ORAL ORAL
Status: COMPLETED | OUTPATIENT
Start: 2025-03-04 | End: 2025-03-04

## 2025-03-04 RX ORDER — OXYCODONE AND ACETAMINOPHEN 10; 325 MG/1; MG/1
.5-1 TABLET ORAL EVERY 6 HOURS PRN
Qty: 28 TABLET | Refills: 0 | Status: SHIPPED | OUTPATIENT
Start: 2025-03-04 | End: 2025-03-11

## 2025-03-04 RX ADMIN — MIDAZOLAM HYDROCHLORIDE 2 MG: 1 INJECTION, SOLUTION INTRAMUSCULAR; INTRAVENOUS at 11:59

## 2025-03-04 RX ADMIN — FENTANYL CITRATE 50 MCG: 50 INJECTION, SOLUTION INTRAMUSCULAR; INTRAVENOUS at 13:23

## 2025-03-04 RX ADMIN — SODIUM CHLORIDE, POTASSIUM CHLORIDE, SODIUM LACTATE AND CALCIUM CHLORIDE: 600; 310; 30; 20 INJECTION, SOLUTION INTRAVENOUS at 10:18

## 2025-03-04 RX ADMIN — ACETAMINOPHEN 1000 MG: 500 TABLET ORAL at 10:20

## 2025-03-04 RX ADMIN — DEXAMETHASONE SODIUM PHOSPHATE 4 MG: 4 INJECTION INTRA-ARTICULAR; INTRALESIONAL; INTRAMUSCULAR; INTRAVENOUS; SOFT TISSUE at 12:43

## 2025-03-04 RX ADMIN — HYDROMORPHONE HYDROCHLORIDE 1 MG: 2 INJECTION INTRAMUSCULAR; INTRAVENOUS; SUBCUTANEOUS at 12:20

## 2025-03-04 RX ADMIN — FENTANYL CITRATE 50 MCG: 50 INJECTION, SOLUTION INTRAMUSCULAR; INTRAVENOUS at 12:43

## 2025-03-04 RX ADMIN — PROPOFOL 200 MG: 10 INJECTION, EMULSION INTRAVENOUS at 12:04

## 2025-03-04 RX ADMIN — LIDOCAINE HYDROCHLORIDE 100 MG: 20 INJECTION, SOLUTION EPIDURAL; INFILTRATION; INTRACAUDAL; PERINEURAL at 12:04

## 2025-03-04 RX ADMIN — CEFAZOLIN 3 G: 1 INJECTION, POWDER, FOR SOLUTION INTRAMUSCULAR; INTRAVENOUS at 12:10

## 2025-03-04 RX ADMIN — HYDROMORPHONE HYDROCHLORIDE 1 MG: 2 INJECTION INTRAMUSCULAR; INTRAVENOUS; SUBCUTANEOUS at 12:11

## 2025-03-04 RX ADMIN — ONDANSETRON 4 MG: 2 INJECTION INTRAMUSCULAR; INTRAVENOUS at 12:43

## 2025-03-04 RX ADMIN — OXYCODONE HYDROCHLORIDE 10 MG: 5 SOLUTION ORAL at 14:21

## 2025-03-04 RX ADMIN — Medication 200 MCG: at 13:03

## 2025-03-04 ASSESSMENT — PAIN DESCRIPTION - PAIN TYPE
TYPE: SURGICAL PAIN
TYPE: ACUTE PAIN
TYPE: SURGICAL PAIN

## 2025-03-04 ASSESSMENT — PAIN SCALES - GENERAL: PAIN_LEVEL: 0

## 2025-03-04 ASSESSMENT — FIBROSIS 4 INDEX: FIB4 SCORE: 1.15

## 2025-03-04 NOTE — ANESTHESIA PROCEDURE NOTES
Airway    Date/Time: 3/4/2025 12:06 PM    Performed by: Nacho Wallace M.D.  Authorized by: Nacho Wallace M.D.    Location:  OR  Urgency:  Elective  Indications for Airway Management:  Anesthesia      Spontaneous Ventilation: absent    Sedation Level:  Deep  Preoxygenated: Yes    Mask Difficulty Assessment:  0 - not attempted  Final Airway Type:  Supraglottic airway  Final Supraglottic Airway:  Standard LMA    SGA Size:  5  Number of Attempts at Approach:  1
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
## 2025-03-04 NOTE — OR NURSING
1450 Arrived from PACU AXO, Pt's VSS; denies N/V; states pain is at tolerable level. Dressing CDI to Rue.    49051 D/c orders received. IV dc'd. Pt changed into clothing with assistance. Discharge reviewed, Pt and family verbalized understanding and questions answered. Patient states ready to d/c home. Pt discharged ambulatory.

## 2025-03-04 NOTE — ANESTHESIA PREPROCEDURE EVALUATION
Case: [id number] Date/Time: 03/04/25 1045    Procedures:       SURGICAL FIXATION OF RIGHT DISTAL RADIUS FRACTURE AND REMOVAL OF EXTERNAL FIXATOR, OTHER INDICATED PROCEDURES (Right: Arm Lower)      REMOVAL, EXTERNAL FIXATION DEVICE (Right: Arm Lower)    Anesthesia type: General    Pre-op diagnosis: CLOSED FRACTURE OF DISTAL END OF RADIUS    Location: [facility] OR 06 / SURGERY [facility]    Surgeons: Mukul Napoles M.D.            Relevant Problems   CARDIAC   (positive) Resistant hypertension         (positive) Acute renal failure (HCC)       Physical Exam    Airway   Mallampati: II  TM distance: >3 FB  Neck ROM: full       Cardiovascular - normal exam  Rhythm: regular  Rate: normal  (-) murmur     Dental - normal exam           Pulmonary - normal exam  Breath sounds clear to auscultation     Abdominal    Neurological - normal exam                   Anesthesia Plan    ASA 3   ASA physical status 3 criteria: morbid obesity - BMI greater than or equal to 40    Plan - general       Airway plan will be LMA          Induction: intravenous    Postoperative Plan: Postoperative administration of opioids is intended.    Pertinent diagnostic labs and testing reviewed    Informed Consent:    Anesthetic plan and risks discussed with patient.    Use of blood products discussed with: patient whom consented to blood products.

## 2025-03-04 NOTE — PROGRESS NOTES
Medication history reviewed with PT's spouse Gloria at bedside    Med rec is complete per spouse reporting    Allergies reviewed.     PT was on Cephalexin 500mg 2 day course started 02/27/2025. Last dose was 02/27/2025. Course was completed.    Patient is not taking anticoagulants.    Preferred pharmacy for this visit - [facility] on [address] [address] ([phone])

## 2025-03-04 NOTE — OR SURGEON
Immediate Post OP Note    PreOp Diagnosis: Right comminuted, displaced intraarticular distal radius fracture      PostOp Diagnosis: same      Procedure(s):  SURGICAL FIXATION OF RIGHT DISTAL RADIUS FRACTURE AND REMOVAL OF EXTERNAL FIXATOR, OTHER INDICATED PROCEDURES - Wound Class: Clean  REMOVAL, EXTERNAL FIXATION DEVICE - Wound Class: Clean    Surgeon(s):  Mukul aNpoles M.D.    Anesthesiologist/Type of Anesthesia:  Anesthesiologist: Nacho Wallace M.D./General    Surgical Staff:  Circulator: Ivan Anthony R.N.  Relief Scrub: Humberto Mims R.N.  Scrub Person: Laney Brown  Radiology Technologist: Danika Morales    Specimens removed if any:  * No specimens in log *    Estimated Blood Loss: minimal    Findings: see dictation    Complications: none known    PLAN:  --discharge to home when recovered from anesthesia  --NWB RUE in splint with sling for comfort  --fu 2 weeks postop as scheduled        3/4/2025 1:40 PM Mukul Napoles M.D.

## 2025-03-04 NOTE — OR NURSING
*This is a Running Note*    1349- Pt to PACU 12B from OR. Bedside report from anesthesiologist and RN.  Attached to monitoring, VSS, breathing is calm and unlabored, Patient is asleep currently. Pt has a dressing on R Wrist and CDI. Ice pack placed on wrist.Remains on 5 L oxygen via mask.    1358 Pt denies pain or nausea at this time.     1415 Pt Now on RA, and has had some water, tolerating well. Washed face and feeling better.    1421 Pt has some pain see MAR.    1430 Criteria met to transition patient to phase 2 recovery.    1447 Report given to PURA Crandall in Phase 2  1453 Headed to Phase 2.

## 2025-03-04 NOTE — DISCHARGE INSTRUCTIONS
HOME CARE INSTRUCTIONS    ACTIVITY: Rest and take it easy for the first 24 hours.  A responsible adult is recommended to remain with you during that time.  It is normal to feel sleepy.  We encourage you to not do anything that requires balance, judgment or coordination.    FOR 24 HOURS DO NOT:  Drive, operate machinery or run household appliances.  Drink beer or alcoholic beverages.  Make important decisions or sign legal documents.    SPECIAL INSTRUCTIONS: Non-weight baring RUE in splint with sling for comfort     DIET: To avoid nausea, slowly advance diet as tolerated, avoiding spicy or greasy foods for the first day.  Add more substantial food to your diet according to your physician's instructions.  Babies can be fed formula or breast milk as soon as they are hungry.  INCREASE FLUIDS AND FIBER TO AVOID CONSTIPATION.    SURGICAL DRESSING/BATHING: -keep splint clean and dry, no submerging or getting it wet.    MEDICATIONS: Resume taking daily medication.  Take prescribed pain medication with food.  If no medication is prescribed, you may take non-aspirin pain medication if needed.  PAIN MEDICATION CAN BE VERY CONSTIPATING.  Take a stool softener or laxative such as senokot, pericolace, or milk of magnesia if needed.    Prescription given for oxyCODONE-acetaminophen (PERCOCET-10)  MG Tab  was filled at [facility] on [address] [facility]. For moderate postop pain, okay to take OTC ibuprofen and/or tylenol PRN mild postop pain.     Last pain medication given at 1000 am (Tylenol), and Roxicodone at 2:21pm.     A follow-up appointment should be arranged with your doctor in 2 weeks; call to schedule.    You should CALL YOUR PHYSICIAN if you develop:  Fever greater than 101 degrees F.  Pain not relieved by medication, or persistent nausea or vomiting.  Excessive bleeding (blood soaking through dressing) or unexpected drainage from the wound.  Extreme redness or swelling around the incision site, drainage of pus or foul smelling  drainage.  Inability to urinate or empty your bladder within 8 hours.  Problems with breathing or chest pain.    You should call 911 if you develop problems with breathing or chest pain.  If you are unable to contact your doctor or surgical center, you should go to the nearest emergency room or urgent care center.  Physician's telephone #: [phone]    MILD FLU-LIKE SYMPTOMS ARE NORMAL.  YOU MAY EXPERIENCE GENERALIZED MUSCLE ACHES, THROAT IRRITATION, HEADACHE AND/OR SOME NAUSEA.    If any questions arise, call your doctor.  If your doctor is not available, please feel free to call the Surgical Center at [phone].  The Center is open Monday through Friday from 7AM to 7PM.      A registered nurse may call you a few days after your surgery to see how you are doing after your procedure.    You may also receive a survey in the mail within the next two weeks and we ask that you take a few moments to complete the survey and return it to us.  Our goal is to provide you with very good care and we value your comments.     Depression / Suicide Risk    As you are discharged from this [facility] facility, it is important to learn how to keep safe from harming yourself.    Recognize the warning signs:  Abrupt changes in personality, positive or negative- including increase in energy   Giving away possessions  Change in eating patterns- significant weight changes-  positive or negative  Change in sleeping patterns- unable to sleep or sleeping all the time   Unwillingness or inability to communicate  Depression  Unusual sadness, discouragement and loneliness  Talk of wanting to die  Neglect of personal appearance   Rebelliousness- reckless behavior  Withdrawal from people/activities they love  Confusion- inability to concentrate     If you or a loved one observes any of these behaviors or has concerns about self-harm, here's what you can do:  Talk about it- your feelings and reasons for harming yourself  Remove any  means that you might use to hurt yourself (examples: pills, rope, extension cords, firearm)  Get professional help from the community (Mental Health, Substance Abuse, psychological counseling)  Do not be alone:Call your Safe Contact- someone whom you trust who will be there for you.  Call your local CRISIS HOTLINE 344-0189 or 979-639-1798  Call your local Children's Mobile Crisis Response Team [place] (683) 349-5376 or www.Vineloop  Call the toll free National Suicide Prevention Hotlines   National Suicide Prevention Lifeline 858-446-QBPS (5796)  National [place] Line Network 800-SUICIDE (264-3306)    I acknowledge receipt and understanding of these Home Care instructions.

## 2025-03-04 NOTE — ANESTHESIA POSTPROCEDURE EVALUATION
Patient: Ken Avilez    Procedure Summary       Date: 03/04/25 Room / Location: [facility] OR 06 / SURGERY [facility]    Anesthesia Start: 1157 Anesthesia Stop: 1355    Procedures:       SURGICAL FIXATION OF RIGHT DISTAL RADIUS FRACTURE AND REMOVAL OF EXTERNAL FIXATOR, OTHER INDICATED PROCEDURES (Right: Arm Lower)      REMOVAL, EXTERNAL FIXATION DEVICE (Right: Arm Lower) Diagnosis: (CLOSED FRACTURE OF DISTAL END OF RADIUS)    Surgeons: Mukul Napoles M.D. Responsible Provider: Nacho Wallace M.D.    Anesthesia Type: general ASA Status: 3            Final Anesthesia Type: general  Last vitals  BP   Blood Pressure: 109/54    Temp   36.4 °C (97.6 °F)    Pulse   86   Resp   20    SpO2   93 %      Anesthesia Post Evaluation    Patient location during evaluation: PACU  Patient participation: complete - patient participated  Level of consciousness: awake and alert  Pain score: 0    Airway patency: patent  Anesthetic complications: no  Cardiovascular status: hemodynamically stable  Respiratory status: acceptable  Hydration status: euvolemic    PONV: none          No notable events documented.     Nurse Pain Score: 6 (NPRS)

## 2025-03-04 NOTE — ANESTHESIA TIME REPORT
Anesthesia Start and Stop Event Times       Date Time Event    3/4/2025 1129 Ready for Procedure     1157 Anesthesia Start     1355 Anesthesia Stop          Responsible Staff  03/04/25      Name Role Begin End    Nacho Wallace M.D. Anesth 1157 1357          Overtime Reason:  no overtime (within assigned shift)    Comments:

## 2025-05-05 ENCOUNTER — HOSPITAL ENCOUNTER (OUTPATIENT)
Dept: LAB | Facility: MEDICAL CENTER | Age: 45
End: 2025-05-05
Attending: NURSE PRACTITIONER
Payer: COMMERCIAL

## 2025-05-05 ENCOUNTER — HOSPITAL ENCOUNTER (OUTPATIENT)
Facility: MEDICAL CENTER | Age: 45
End: 2025-05-05
Attending: NURSE PRACTITIONER
Payer: COMMERCIAL

## 2025-05-05 LAB
ALBUMIN SERPL BCP-MCNC: 4.2 G/DL (ref 3.2–4.9)
ALBUMIN/GLOB SERPL: 1.4 G/DL
ALP SERPL-CCNC: 106 U/L (ref 30–99)
ALT SERPL-CCNC: 26 U/L (ref 2–50)
ANION GAP SERPL CALC-SCNC: 13 MMOL/L (ref 7–16)
AST SERPL-CCNC: 21 U/L (ref 12–45)
BASOPHILS # BLD AUTO: 1 % (ref 0–1.8)
BASOPHILS # BLD: 0.1 K/UL (ref 0–0.12)
BILIRUB SERPL-MCNC: 0.6 MG/DL (ref 0.1–1.5)
BUN SERPL-MCNC: 17 MG/DL (ref 8–22)
CALCIUM ALBUM COR SERPL-MCNC: 9.2 MG/DL (ref 8.5–10.5)
CALCIUM SERPL-MCNC: 9.4 MG/DL (ref 8.5–10.5)
CHLORIDE SERPL-SCNC: 104 MMOL/L (ref 96–112)
CO2 SERPL-SCNC: 21 MMOL/L (ref 20–33)
CREAT SERPL-MCNC: 1.24 MG/DL (ref 0.5–1.4)
EOSINOPHIL # BLD AUTO: 0.31 K/UL (ref 0–0.51)
EOSINOPHIL NFR BLD: 3 % (ref 0–6.9)
ERYTHROCYTE [DISTWIDTH] IN BLOOD BY AUTOMATED COUNT: 43.6 FL (ref 35.9–50)
ESTRADIOL SERPL-MCNC: 15 PG/ML
GFR SERPLBLD CREATININE-BSD FMLA CKD-EPI: 73 ML/MIN/1.73 M 2
GLOBULIN SER CALC-MCNC: 2.9 G/DL (ref 1.9–3.5)
GLUCOSE SERPL-MCNC: 134 MG/DL (ref 65–99)
HCT VFR BLD AUTO: 49.7 % (ref 42–52)
HGB BLD-MCNC: 17.4 G/DL (ref 14–18)
IMM GRANULOCYTES # BLD AUTO: 0.05 K/UL (ref 0–0.11)
IMM GRANULOCYTES NFR BLD AUTO: 0.5 % (ref 0–0.9)
LYMPHOCYTES # BLD AUTO: 3.71 K/UL (ref 1–4.8)
LYMPHOCYTES NFR BLD: 36 % (ref 22–41)
MCH RBC QN AUTO: 31.6 PG (ref 27–33)
MCHC RBC AUTO-ENTMCNC: 35 G/DL (ref 32.3–36.5)
MCV RBC AUTO: 90.4 FL (ref 81.4–97.8)
MONOCYTES # BLD AUTO: 0.61 K/UL (ref 0–0.85)
MONOCYTES NFR BLD AUTO: 5.9 % (ref 0–13.4)
NEUTROPHILS # BLD AUTO: 5.53 K/UL (ref 1.82–7.42)
NEUTROPHILS NFR BLD: 53.6 % (ref 44–72)
NRBC # BLD AUTO: 0 K/UL
NRBC BLD-RTO: 0 /100 WBC (ref 0–0.2)
PLATELET # BLD AUTO: 241 K/UL (ref 164–446)
PMV BLD AUTO: 11.8 FL (ref 9–12.9)
POTASSIUM SERPL-SCNC: 3.5 MMOL/L (ref 3.6–5.5)
PROT SERPL-MCNC: 7.1 G/DL (ref 6–8.2)
RBC # BLD AUTO: 5.5 M/UL (ref 4.7–6.1)
SODIUM SERPL-SCNC: 138 MMOL/L (ref 135–145)
WBC # BLD AUTO: 10.3 K/UL (ref 4.8–10.8)

## 2025-05-05 PROCEDURE — 84153 ASSAY OF PSA TOTAL: CPT

## 2025-05-05 PROCEDURE — 84402 ASSAY OF FREE TESTOSTERONE: CPT

## 2025-05-05 PROCEDURE — 80053 COMPREHEN METABOLIC PANEL: CPT

## 2025-05-05 PROCEDURE — 36415 COLL VENOUS BLD VENIPUNCTURE: CPT

## 2025-05-05 PROCEDURE — 82670 ASSAY OF TOTAL ESTRADIOL: CPT

## 2025-05-05 PROCEDURE — 85025 COMPLETE CBC W/AUTO DIFF WBC: CPT

## 2025-05-05 PROCEDURE — 84403 ASSAY OF TOTAL TESTOSTERONE: CPT

## 2025-05-05 PROCEDURE — 84270 ASSAY OF SEX HORMONE GLOBUL: CPT

## 2025-05-05 PROCEDURE — 84154 ASSAY OF PSA FREE: CPT

## 2025-05-07 LAB
PSA FREE MFR SERPL: 21 %
PSA FREE SERPL-MCNC: 0.6 NG/ML
PSA SERPL-MCNC: 2.8 NG/ML (ref 0–4)
SHBG SERPL-SCNC: 28 NMOL/L (ref 17–56)
TESTOST FREE MFR SERPL: 2 % (ref 1.6–2.9)
TESTOST FREE SERPL-MCNC: 71 PG/ML (ref 47–244)
TESTOST SERPL-MCNC: 359 NG/DL (ref 300–890)

## 2025-06-28 ENCOUNTER — OFFICE VISIT (OUTPATIENT)
Dept: URGENT CARE | Facility: PHYSICIAN GROUP | Age: 45
End: 2025-06-28
Payer: COMMERCIAL

## 2025-06-28 ENCOUNTER — APPOINTMENT (OUTPATIENT)
Dept: RADIOLOGY | Facility: IMAGING CENTER | Age: 45
End: 2025-06-28
Attending: FAMILY MEDICINE
Payer: COMMERCIAL

## 2025-06-28 VITALS
DIASTOLIC BLOOD PRESSURE: 90 MMHG | OXYGEN SATURATION: 97 % | HEIGHT: 71 IN | RESPIRATION RATE: 20 BRPM | HEART RATE: 100 BPM | WEIGHT: 292 LBS | TEMPERATURE: 98.2 F | SYSTOLIC BLOOD PRESSURE: 152 MMHG | BODY MASS INDEX: 40.88 KG/M2

## 2025-06-28 DIAGNOSIS — S99.912A INJURY OF LEFT ANKLE, INITIAL ENCOUNTER: ICD-10-CM

## 2025-06-28 DIAGNOSIS — S99.922A INJURY OF LEFT FOOT, INITIAL ENCOUNTER: ICD-10-CM

## 2025-06-28 DIAGNOSIS — S82.832A CLOSED FRACTURE OF DISTAL END OF LEFT FIBULA, UNSPECIFIED FRACTURE MORPHOLOGY, INITIAL ENCOUNTER: Primary | ICD-10-CM

## 2025-06-28 DIAGNOSIS — S91.112A LACERATION OF LEFT GREAT TOE WITHOUT DAMAGE TO NAIL, FOREIGN BODY PRESENCE UNSPECIFIED, INITIAL ENCOUNTER: ICD-10-CM

## 2025-06-28 PROCEDURE — 73630 X-RAY EXAM OF FOOT: CPT | Mod: TC,LT | Performed by: RADIOLOGY

## 2025-06-28 PROCEDURE — 73610 X-RAY EXAM OF ANKLE: CPT | Mod: TC,LT | Performed by: RADIOLOGY

## 2025-06-28 ASSESSMENT — ENCOUNTER SYMPTOMS
EYE REDNESS: 0
WEIGHT LOSS: 0
VOMITING: 0
NAUSEA: 0
EYE DISCHARGE: 0

## 2025-06-28 ASSESSMENT — FIBROSIS 4 INDEX: FIB4 SCORE: 0.77
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
## 2025-07-07 NOTE — Clinical Note
REFERRAL APPROVAL NOTICE         Sent on July 7, 2025                   Ken Avilez  [address] [address] [facility] [address]                   Dear Mr. Avilez,    After a careful review of the medical information and benefit coverage, [facility] has processed your referral. See below for additional details.    If applicable, you must be actively enrolled with your insurance for coverage of the authorized service. If you have any questions regarding your coverage, please contact your insurance directly.    REFERRAL INFORMATION   Referral #:  [id number]  Referred-To Provider    Referred-By Provider:  Orthopedic Surgery    MARY JANE Benitez AARON J      [address]  [facility] [address]  [phone] [address]  [address]  [facility] [id number] [phone]    Referral Start Date:  06/28/2025  Referral End Date:   06/28/2026             SCHEDULING  If you do not already have an appointment, please call [phone] to make an appointment.     MORE INFORMATION  If you do not already have a [facility] account, sign up at: [email or web address]  You can access your medical information, make appointments, see lab results, billing information, and more.  If you have questions regarding this referral, please contact  the [facility] Referrals department at:             [phone]. Monday - Friday 8:00AM - 5:00PM.     Sincerely,    [facility]

## (undated) DEVICE — SET LEADWIRE 5 LEAD BEDSIDE DISPOSABLE ECG (1SET OF 5/EA)

## (undated) DEVICE — COVER LIGHT HANDLE ALC PLUS DISP (18EA/BX)

## (undated) DEVICE — CANISTER SUCTION 3000ML MECHANICAL FILTER AUTO SHUTOFF MEDI-VAC NONSTERILE LF DISP (40EA/CA)

## (undated) DEVICE — BLOCK

## (undated) DEVICE — GLOVE BIOGEL PI ORTHO SZ 7 PF LF (40PR/BX)

## (undated) DEVICE — PACK UPPER EXTREMITY (2EA/CA)

## (undated) DEVICE — GLOVE BIOGEL SZ 8 SURGICAL PF LTX - (50PR/BX 4BX/CA)

## (undated) DEVICE — BAG SPONGE COUNT 10.25 X 32 - BLUE (250/CA)

## (undated) DEVICE — DRAPE C-ARM LARGE 41IN X 74 IN - (10/BX 2BX/CA)

## (undated) DEVICE — SENSOR OXIMETER ADULT SPO2 RD SET (20EA/BX)

## (undated) DEVICE — SLEEVE, VASO, THIGH, MED

## (undated) DEVICE — DRAPE LARGE 3 QUARTER - (20/CA)

## (undated) DEVICE — CHLORAPREP 26 ML APPLICATOR - ORANGE TINT(25/CA)

## (undated) DEVICE — ELECTRODE DUAL RETURN W/ CORD - (50/PK)

## (undated) DEVICE — SET IRRIGATION CYSTOSCOPY TUBE L80 IN (20EA/CA)

## (undated) DEVICE — PAD PREP 24 X 48 CUFFED - (100/CA)

## (undated) DEVICE — GLOVE BIOGEL SZ 6.5 SURGICAL PF LTX (50PR/BX 4BX/CA)

## (undated) DEVICE — TOURNIQUET CUFF 18 X 3 ONE PORT DISP - STERILE (10/BX)

## (undated) DEVICE — TUBE E-T HI-LO CUFF 7.5MM (10EA/PK)

## (undated) DEVICE — DRAPE SURGICAL U 77X120 - (10/CA)

## (undated) DEVICE — DRAPE SURG STERI-DRAPE 7X11OD - (10EA/BX, 40EA/CA)

## (undated) DEVICE — MASK ANESTHESIA ADULT  - (100/CA)

## (undated) DEVICE — SUTURE 3-0 ETHILON 3-0 PSLX 30 BLACK (36PK/BX)

## (undated) DEVICE — SUCTION INSTRUMENT YANKAUER BULBOUS TIP W/O VENT (50EA/CA)

## (undated) DEVICE — DRAPE 36X28IN RAD CARM BND BG - (25/CA)

## (undated) DEVICE — CANISTER SUCTION 3000ML MECHANICAL FILTER AUTO SHUTOFF MEDI-VAC NONSTERILE LF DISP  (40EA/CA)

## (undated) DEVICE — GLOVE BIOGEL INDICATOR SZ 8 SURGICAL PF LTX - (50/BX 4BX/CA)

## (undated) DEVICE — WRAP CO-FLEX 4IN X 5YD STERIL - SELF-ADHERENT (18/CA)

## (undated) DEVICE — GLOVE BIOGEL PI INDICATOR SZ 6.5 SURGICAL PF LF - (50/BX 4BX/CA)

## (undated) DEVICE — GLOVE BIOGEL SZ 7.5 SURGICAL PF LTX - (50PR/BX 4BX/CA)

## (undated) DEVICE — KIT ANESTHESIA W/CIRCUIT & 3/LT BAG W/FILTER (20EA/CA)

## (undated) DEVICE — DRAPE STRLE REG TOWEL 18X24 - (10/BX 4BX/CA)"

## (undated) DEVICE — GLOVE BIOGEL SZ 7 SURGICAL PF LTX - (50PR/BX 4BX/CA)

## (undated) DEVICE — TUBING CLEARLINK DUO-VENT - C-FLO (48EA/CA)

## (undated) DEVICE — GOWN SURGEONS X-LARGE - DISP. (30/CA)

## (undated) DEVICE — MASK, LARYNGEAL AIRWAY #5

## (undated) DEVICE — WATER IRRIGATION STERILE 1000ML (12EA/CA)

## (undated) DEVICE — KIT ROOM DECONTAMINATION

## (undated) DEVICE — GLOVE BIOGEL PI INDICATOR SZ 7.0 SURGICAL PF LF - (50/BX 4BX/CA)

## (undated) DEVICE — SODIUM CHL IRRIGATION 0.9% 1000ML (12EA/CA)

## (undated) DEVICE — WRAP COBAN SELF-ADHERENT 6 IN X 5YDS STERILE TAN (12/CA)

## (undated) DEVICE — BOVIE BLADE COATED - (50/PK)

## (undated) DEVICE — SET EXTENSION WITH 2 PORTS (48EA/CA) ***PART #2C8610 IS A SUBSTITUTE*****

## (undated) DEVICE — LEAD SET 6 DISP. EKG NIHON KOHDEN (100EA/CA) [9859].

## (undated) DEVICE — BIT DRILL DIA2MM SCALED FOR VARIAX 2 WRIST FUSION LOCKING PLATE SYSTEM

## (undated) DEVICE — GLOVE BIOGEL PI ORTHO SZ 8.5 PF LF (40/BX)

## (undated) DEVICE — SUTURE 2-0 VICRYL PLUS CT-1 36 (36PK/BX)"

## (undated) DEVICE — SPLINT PLASTER 4 IN X 15 IN - (50/BX 12BX/CA)

## (undated) DEVICE — PROTECTOR ULNA NERVE - (36PR/CA)

## (undated) DEVICE — BANDAGE ROLL STERILE BULKEE 4.5 IN X 4 YD (100EA/CA)

## (undated) DEVICE — HEAD HOLDER JUNIOR/ADULT

## (undated) DEVICE — DRESSING PETROLEUM GAUZE 5 X 9" (50EA/BX 4BX/CA)"

## (undated) DEVICE — GLOVE SZ 6 BIOGEL PI MICRO - PF LF (50PR/BX 4BX/CA)

## (undated) DEVICE — LACTATED RINGERS INJ 1000 ML - (14EA/CA 60CA/PF)

## (undated) DEVICE — GLOVE BIOGEL INDICATOR SZ 7.5 SURGICAL PF LTX - (50PR/BX 4BX/CA)

## (undated) DEVICE — PADDING CAST 6 IN STERILE - 6 X 4 YDS (24/CA)

## (undated) DEVICE — SUTURE GENERAL

## (undated) DEVICE — TOURNIQUET CUFF 34 X 4 ONE PORT DISP - STERILE (10/BX)

## (undated) DEVICE — GLOVE BIOGEL PI ORTHO SZ 7.5 PF LF (40PR/BX)

## (undated) DEVICE — GOWN WARMING STANDARD FLEX - (30/CA)

## (undated) DEVICE — NEPTUNE 4 PORT MANIFOLD - (20/PK)

## (undated) DEVICE — PACK LOWER EXTREMITY - (2/CA)

## (undated) DEVICE — SENSOR SPO2 NEO LNCS ADHESIVE (20/BX) SEE USER NOTES

## (undated) DEVICE — SUTURE 2-0 VICRYL PLUS CT-1 - 8 X 18 INCH(12/BX)

## (undated) DEVICE — GLOVE SZ 7 BIOGEL PI MICRO - PF LF (50PR/BX 4BX/CA)

## (undated) DEVICE — DRAPESURG STERI-DRAPE LONG - (10/BX 4BX/CA)

## (undated) DEVICE — SUTURE 0 VICRYL PLUS CT-1 - 36 INCH (36/BX)

## (undated) DEVICE — Device

## (undated) DEVICE — SUTURE ETHILON 2-0 FSLX 30 (36PK/BX)"

## (undated) DEVICE — STAPLER SKIN DISP - (6/BX 10BX/CA) VISISTAT

## (undated) DEVICE — DRAPE C ARMOR (12EA/CA)

## (undated) DEVICE — ELECTRODE 850 FOAM ADHESIVE - HYDROGEL RADIOTRNSPRNT (50/PK)

## (undated) DEVICE — DRILL BIT 1.8MMX80MM CALIBRATED (4TX2=8)

## (undated) DEVICE — STOCKINET BIAS 6 IN STERILE - (20/CA)